# Patient Record
Sex: FEMALE | Race: WHITE | NOT HISPANIC OR LATINO | Employment: OTHER | URBAN - METROPOLITAN AREA
[De-identification: names, ages, dates, MRNs, and addresses within clinical notes are randomized per-mention and may not be internally consistent; named-entity substitution may affect disease eponyms.]

---

## 2017-02-17 ENCOUNTER — APPOINTMENT (OUTPATIENT)
Dept: RADIOLOGY | Facility: MEDICAL CENTER | Age: 82
DRG: 064 | End: 2017-02-17
Attending: EMERGENCY MEDICINE
Payer: MEDICARE

## 2017-02-17 ENCOUNTER — RESOLUTE PROFESSIONAL BILLING HOSPITAL PROF FEE (OUTPATIENT)
Dept: HOSPITALIST | Facility: MEDICAL CENTER | Age: 82
End: 2017-02-17
Payer: MEDICARE

## 2017-02-17 ENCOUNTER — APPOINTMENT (OUTPATIENT)
Dept: RADIOLOGY | Facility: MEDICAL CENTER | Age: 82
DRG: 064 | End: 2017-02-17
Attending: HOSPITALIST
Payer: MEDICARE

## 2017-02-17 ENCOUNTER — HOSPITAL ENCOUNTER (INPATIENT)
Facility: MEDICAL CENTER | Age: 82
LOS: 8 days | DRG: 064 | End: 2017-02-25
Attending: EMERGENCY MEDICINE | Admitting: HOSPITALIST
Payer: MEDICARE

## 2017-02-17 DIAGNOSIS — R53.1 WEAKNESS: ICD-10-CM

## 2017-02-17 DIAGNOSIS — A41.9 SEPSIS, DUE TO UNSPECIFIED ORGANISM: ICD-10-CM

## 2017-02-17 DIAGNOSIS — S06.5XAA SDH (SUBDURAL HEMATOMA) (HCC): ICD-10-CM

## 2017-02-17 DIAGNOSIS — E86.9 VOLUME DEPLETION: ICD-10-CM

## 2017-02-17 PROBLEM — R65.10 SIRS (SYSTEMIC INFLAMMATORY RESPONSE SYNDROME) (HCC): Status: ACTIVE | Noted: 2017-02-17

## 2017-02-17 PROBLEM — I95.9 HYPOTENSION: Status: ACTIVE | Noted: 2017-02-17

## 2017-02-17 LAB
ALBUMIN SERPL BCP-MCNC: 3.5 G/DL (ref 3.2–4.9)
ALBUMIN/GLOB SERPL: 1.3 G/DL
ALP SERPL-CCNC: 83 U/L (ref 30–99)
ALT SERPL-CCNC: 10 U/L (ref 2–50)
ANION GAP SERPL CALC-SCNC: 10 MMOL/L (ref 0–11.9)
APPEARANCE UR: CLEAR
AST SERPL-CCNC: 16 U/L (ref 12–45)
BASOPHILS # BLD AUTO: 0.5 % (ref 0–1.8)
BASOPHILS # BLD: 0.06 K/UL (ref 0–0.12)
BILIRUB SERPL-MCNC: 0.8 MG/DL (ref 0.1–1.5)
BILIRUB UR QL STRIP.AUTO: NEGATIVE
BUN SERPL-MCNC: 12 MG/DL (ref 8–22)
CALCIUM SERPL-MCNC: 8.7 MG/DL (ref 8.4–10.2)
CHLORIDE SERPL-SCNC: 103 MMOL/L (ref 96–112)
CO2 SERPL-SCNC: 22 MMOL/L (ref 20–33)
COLOR UR: YELLOW
CREAT SERPL-MCNC: 0.92 MG/DL (ref 0.5–1.4)
EOSINOPHIL # BLD AUTO: 0.02 K/UL (ref 0–0.51)
EOSINOPHIL NFR BLD: 0.2 % (ref 0–6.9)
ERYTHROCYTE [DISTWIDTH] IN BLOOD BY AUTOMATED COUNT: 44.7 FL (ref 35.9–50)
GFR SERPL CREATININE-BSD FRML MDRD: 57 ML/MIN/1.73 M 2
GLOBULIN SER CALC-MCNC: 2.7 G/DL (ref 1.9–3.5)
GLUCOSE SERPL-MCNC: 110 MG/DL (ref 65–99)
GLUCOSE UR STRIP.AUTO-MCNC: NEGATIVE MG/DL
HCT VFR BLD AUTO: 38.5 % (ref 37–47)
HGB BLD-MCNC: 12.8 G/DL (ref 12–16)
IMM GRANULOCYTES # BLD AUTO: 0.03 K/UL (ref 0–0.11)
IMM GRANULOCYTES NFR BLD AUTO: 0.3 % (ref 0–0.9)
KETONES UR STRIP.AUTO-MCNC: ABNORMAL MG/DL
LACTATE BLD-SCNC: 2.11 MMOL/L (ref 0.5–2)
LACTATE BLD-SCNC: 2.59 MMOL/L (ref 0.5–2)
LACTATE BLD-SCNC: 2.68 MMOL/L (ref 0.5–2)
LEUKOCYTE ESTERASE UR QL STRIP.AUTO: NEGATIVE
LYMPHOCYTES # BLD AUTO: 1.71 K/UL (ref 1–4.8)
LYMPHOCYTES NFR BLD: 15.3 % (ref 22–41)
MCH RBC QN AUTO: 30.4 PG (ref 27–33)
MCHC RBC AUTO-ENTMCNC: 33.2 G/DL (ref 33.6–35)
MCV RBC AUTO: 91.4 FL (ref 81.4–97.8)
MICRO URNS: ABNORMAL
MONOCYTES # BLD AUTO: 1.23 K/UL (ref 0–0.85)
MONOCYTES NFR BLD AUTO: 11 % (ref 0–13.4)
NEUTROPHILS # BLD AUTO: 8.11 K/UL (ref 2–7.15)
NEUTROPHILS NFR BLD: 72.7 % (ref 44–72)
NITRITE UR QL STRIP.AUTO: NEGATIVE
NRBC # BLD AUTO: 0 K/UL
NRBC BLD AUTO-RTO: 0 /100 WBC
PH UR STRIP.AUTO: 6.5 [PH]
PLATELET # BLD AUTO: 276 K/UL (ref 164–446)
PMV BLD AUTO: 9.7 FL (ref 9–12.9)
POTASSIUM SERPL-SCNC: 3.9 MMOL/L (ref 3.6–5.5)
PROT SERPL-MCNC: 6.2 G/DL (ref 6–8.2)
PROT UR QL STRIP: NEGATIVE MG/DL
RBC # BLD AUTO: 4.21 M/UL (ref 4.2–5.4)
RBC UR QL AUTO: NEGATIVE
SODIUM SERPL-SCNC: 135 MMOL/L (ref 135–145)
SP GR UR STRIP.AUTO: 1.01
SPECIMEN DRAWN FROM PATIENT: ABNORMAL
WBC # BLD AUTO: 11.2 K/UL (ref 4.8–10.8)

## 2017-02-17 PROCEDURE — 87086 URINE CULTURE/COLONY COUNT: CPT

## 2017-02-17 PROCEDURE — 83605 ASSAY OF LACTIC ACID: CPT

## 2017-02-17 PROCEDURE — 70450 CT HEAD/BRAIN W/O DYE: CPT

## 2017-02-17 PROCEDURE — 71010 DX-CHEST-PORTABLE (1 VIEW): CPT

## 2017-02-17 PROCEDURE — 99285 EMERGENCY DEPT VISIT HI MDM: CPT

## 2017-02-17 PROCEDURE — 770020 HCHG ROOM/CARE - TELE (206)

## 2017-02-17 PROCEDURE — 700102 HCHG RX REV CODE 250 W/ 637 OVERRIDE(OP): Performed by: HOSPITALIST

## 2017-02-17 PROCEDURE — 96365 THER/PROPH/DIAG IV INF INIT: CPT

## 2017-02-17 PROCEDURE — 700105 HCHG RX REV CODE 258: Performed by: HOSPITALIST

## 2017-02-17 PROCEDURE — 99223 1ST HOSP IP/OBS HIGH 75: CPT | Mod: AI | Performed by: HOSPITALIST

## 2017-02-17 PROCEDURE — 87040 BLOOD CULTURE FOR BACTERIA: CPT

## 2017-02-17 PROCEDURE — A9270 NON-COVERED ITEM OR SERVICE: HCPCS | Performed by: HOSPITALIST

## 2017-02-17 PROCEDURE — 700111 HCHG RX REV CODE 636 W/ 250 OVERRIDE (IP): Performed by: HOSPITALIST

## 2017-02-17 PROCEDURE — 36415 COLL VENOUS BLD VENIPUNCTURE: CPT

## 2017-02-17 PROCEDURE — 80053 COMPREHEN METABOLIC PANEL: CPT

## 2017-02-17 PROCEDURE — 85025 COMPLETE CBC W/AUTO DIFF WBC: CPT

## 2017-02-17 PROCEDURE — 700111 HCHG RX REV CODE 636 W/ 250 OVERRIDE (IP): Performed by: EMERGENCY MEDICINE

## 2017-02-17 PROCEDURE — 81003 URINALYSIS AUTO W/O SCOPE: CPT

## 2017-02-17 PROCEDURE — 96361 HYDRATE IV INFUSION ADD-ON: CPT

## 2017-02-17 RX ORDER — CLOPIDOGREL BISULFATE 75 MG/1
75 TABLET ORAL DAILY
Status: DISCONTINUED | OUTPATIENT
Start: 2017-02-17 | End: 2017-02-17

## 2017-02-17 RX ORDER — UREA 10 %
800 LOTION (ML) TOPICAL EVERY MORNING
Status: ON HOLD | COMMUNITY
End: 2017-02-25

## 2017-02-17 RX ORDER — SODIUM CHLORIDE 9 MG/ML
30 INJECTION, SOLUTION INTRAVENOUS
Status: DISCONTINUED | OUTPATIENT
Start: 2017-02-17 | End: 2017-02-25 | Stop reason: HOSPADM

## 2017-02-17 RX ORDER — SODIUM CHLORIDE 9 MG/ML
500 INJECTION, SOLUTION INTRAVENOUS
Status: DISCONTINUED | OUTPATIENT
Start: 2017-02-17 | End: 2017-02-25 | Stop reason: HOSPADM

## 2017-02-17 RX ORDER — LEVOTHYROXINE SODIUM 0.05 MG/1
100 TABLET ORAL
Status: DISCONTINUED | OUTPATIENT
Start: 2017-02-17 | End: 2017-02-17

## 2017-02-17 RX ORDER — ACETAMINOPHEN 325 MG/1
650 TABLET ORAL EVERY 6 HOURS PRN
Status: ON HOLD | COMMUNITY
End: 2017-02-25

## 2017-02-17 RX ORDER — SODIUM CHLORIDE 9 MG/ML
30 INJECTION, SOLUTION INTRAVENOUS ONCE
Status: COMPLETED | OUTPATIENT
Start: 2017-02-17 | End: 2017-02-17

## 2017-02-17 RX ORDER — DOCUSATE SODIUM 100 MG/1
100 CAPSULE, LIQUID FILLED ORAL 2 TIMES DAILY
Status: DISCONTINUED | OUTPATIENT
Start: 2017-02-17 | End: 2017-02-25 | Stop reason: HOSPADM

## 2017-02-17 RX ORDER — VENLAFAXINE HYDROCHLORIDE 75 MG/1
150 CAPSULE, EXTENDED RELEASE ORAL DAILY
Status: DISCONTINUED | OUTPATIENT
Start: 2017-02-17 | End: 2017-02-25 | Stop reason: HOSPADM

## 2017-02-17 RX ORDER — LATANOPROST 50 UG/ML
1 SOLUTION/ DROPS OPHTHALMIC NIGHTLY
Status: ON HOLD | COMMUNITY
End: 2017-02-25

## 2017-02-17 RX ORDER — CEFTRIAXONE 2 G/1
2 INJECTION, POWDER, FOR SOLUTION INTRAMUSCULAR; INTRAVENOUS ONCE
Status: COMPLETED | OUTPATIENT
Start: 2017-02-17 | End: 2017-02-17

## 2017-02-17 RX ORDER — LEVOTHYROXINE SODIUM 0.05 MG/1
100 TABLET ORAL
Status: DISCONTINUED | OUTPATIENT
Start: 2017-02-17 | End: 2017-02-21

## 2017-02-17 RX ORDER — VENLAFAXINE HYDROCHLORIDE 150 MG/1
150 CAPSULE, EXTENDED RELEASE ORAL DAILY
Status: DISCONTINUED | OUTPATIENT
Start: 2017-02-17 | End: 2017-02-17

## 2017-02-17 RX ORDER — SODIUM CHLORIDE 9 MG/ML
2000 INJECTION, SOLUTION INTRAVENOUS ONCE
Status: COMPLETED | OUTPATIENT
Start: 2017-02-17 | End: 2017-02-17

## 2017-02-17 RX ADMIN — VENLAFAXINE HYDROCHLORIDE 150 MG: 75 CAPSULE, EXTENDED RELEASE ORAL at 18:39

## 2017-02-17 RX ADMIN — SODIUM CHLORIDE 1701 ML: 9 INJECTION, SOLUTION INTRAVENOUS at 13:47

## 2017-02-17 RX ADMIN — DEXTROSE MONOHYDRATE 500 MG: 50 INJECTION, SOLUTION INTRAVENOUS at 18:40

## 2017-02-17 RX ADMIN — SODIUM CHLORIDE 2000 ML: 9 INJECTION, SOLUTION INTRAVENOUS at 18:16

## 2017-02-17 RX ADMIN — CEFTRIAXONE SODIUM 2 G: 2 INJECTION, POWDER, FOR SOLUTION INTRAMUSCULAR; INTRAVENOUS at 15:53

## 2017-02-17 ASSESSMENT — LIFESTYLE VARIABLES
EVER_SMOKED: YES
ALCOHOL_USE: NO

## 2017-02-17 ASSESSMENT — PAIN SCALES - GENERAL: PAINLEVEL_OUTOF10: 0

## 2017-02-17 NOTE — ED NOTES
Sepsis protocal initiated, pt roomed, ED tech in room for lab draw. Report given to Lorraine ESCOBAR

## 2017-02-17 NOTE — ED NOTES
Per caregiver pt increasingly disoriented x 1 week, had recent fall. Today pt unable to walk, hold things due to weakness on left side. Dr. Montoya aware.

## 2017-02-17 NOTE — IP AVS SNAPSHOT
" Home Care Instructions                                                                                                                  Name:Erlinda Chowdhury  Medical Record Number:[id number]  CSN: [id number]    YOB: 1926   Age: 89 y.o.  Sex: female  HT:1.626 m (5' 4\") WT: 60.3 kg (132 lb 15 oz)          Admit Date: 2/17/2017     Discharge Date:   Today's Date: 2/25/2017  Attending Doctor:  Azeem Monroe M.D.                  Allergies:  Codeine; Quinine; and Vicodin            Discharge Instructions       Discharge Instructions    Discharged to other by ambulance with escort. Discharged via ambulance, hospital escort: Yes.  Special equipment needed: Not Applicable    Be sure to schedule a follow-up appointment with your primary care doctor or any specialists as instructed.     Discharge Plan:   Diet Plan: Discussed  Activity Level: Discussed  Confirmed Follow up Appointment: Patient to Call and Schedule Appointment  Confirmed Symptoms Management: Discussed  Medication Reconciliation Updated: Yes  Influenza Vaccine Indication: Not indicated: Previously immunized this influenza season and > 8 years of age    I understand that a diet low in cholesterol, fat, and sodium is recommended for good health. Unless I have been given specific instructions below for another diet, I accept this instruction as my diet prescription.   Other diet: Regular dysphasia III with thin liquids    Special Instructions:   Please resume activity as tolerated.  Please resume a dysphasia III diet.  Please follow up with your primary care physician within two weeks of hospitalization.    · Is patient discharged on Warfarin / Coumadin?   No     · Is patient Post Blood Transfusion?  No    Sepsis, Adult  Sepsis is a serious infection of your blood or tissues that affects your whole body. The infection that causes sepsis may be bacterial, viral, fungal, or parasitic. Sepsis may be life threatening. Sepsis can cause your blood " pressure to drop. This may result in shock. Shock causes your central nervous system and your organs to stop working correctly.   RISK FACTORS  Sepsis can happen in anyone, but it is more likely to happen in people who have weakened immune systems.  SIGNS AND SYMPTOMS   Symptoms of sepsis can include:  · Fever or low body temperature (hypothermia).  · Rapid breathing (hyperventilation).  · Chills.  · Rapid heartbeat (tachycardia).  · Confusion or light-headedness.  · Trouble breathing.  · Urinating much less than usual.  · Cool, clammy skin or red, flushed skin.  · Other problems with the heart, kidneys, or brain.  DIAGNOSIS   Your health care provider will likely do tests to look for an infection, to see if the infection has spread to your blood, and to see how serious your condition is. Tests can include:  · Blood tests, including cultures of your blood.  · Cultures of other fluids from your body, such as:  ¨ Urine.  ¨ Pus from wounds.  ¨ Mucus coughed up from your lungs.  · Urine tests other than cultures.  · X-ray exams or other imaging tests.  TREATMENT   Treatment will begin with elimination of the source of infection. If your sepsis is likely caused by a bacterial or fungal infection, you will be given antibiotic or antifungal medicines.  You may also receive:  · Oxygen.  · Fluids through an IV tube.  · Medicines to increase your blood pressure.  · A machine to clean your blood (dialysis) if your kidneys fail.  · A machine to help you breathe if your lungs fail.  SEEK IMMEDIATE MEDICAL CARE IF:  You get an infection or develop any of the signs and symptoms of sepsis after surgery or a hospitalization.     This information is not intended to replace advice given to you by your health care provider. Make sure you discuss any questions you have with your health care provider.     Document Released: 09/15/2004 Document Revised: 05/03/2016 Document Reviewed: 08/25/2014  [employer] Interactive Patient Education ©2016  [employer]    Depression / Suicide Risk    As you are discharged from this [facility] facility, it is important to learn how to keep safe from harming yourself.    Recognize the warning signs:  · Abrupt changes in personality, positive or negative- including increase in energy   · Giving away possessions  · Change in eating patterns- significant weight changes-  positive or negative  · Change in sleeping patterns- unable to sleep or sleeping all the time   · Unwillingness or inability to communicate  · Depression  · Unusual sadness, discouragement and loneliness  · Talk of wanting to die  · Neglect of personal appearance   · Rebelliousness- reckless behavior  · Withdrawal from people/activities they love  · Confusion- inability to concentrate     If you or a loved one observes any of these behaviors or has concerns about self-harm, here's what you can do:  · Talk about it- your feelings and reasons for harming yourself  · Remove any means that you might use to hurt yourself (examples: pills, rope, extension cords, firearm)  · Get professional help from the community (Mental Health, Substance Abuse, psychological counseling)  · Do not be alone:Call your Safe Contact- someone whom you trust who will be there for you.  · Call your local CRISIS HOTLINE 406-6668 or 138-995-6492  · Call your local Children's Mobile Crisis Response Team [place] (309) 160-5192 or www.Mashalot  · Call the toll free National Suicide Prevention Hotlines   · National Suicide Prevention Lifeline 671-245-VLOC (1554)  · National Hope Line Network 800-SUICIDE (745-2797)        Follow-up Information     1. Follow up with Tr Santiago M.D. In 2 weeks.    Specialty:  Internal Medicine    Contact information    [id number]  [place] [address] [phone]           Discharge Medication Instructions:    Below are the medications your physician expects you to take upon discharge:    Review all your home  medications and newly ordered medications with your doctor and/or pharmacist. Follow medication instructions as directed by your doctor and/or pharmacist.    Please keep your medication list with you and share with your physician.               Medication List      START taking these medications        Instructions     MG Caps   Last time this was given:  100 mg on 2/24/2017  9:04 PM    Take 100 mg by mouth 2 Times a Day.   Dose:  100 mg       metoprolol 25 MG Tabs   Last time this was given:  25 mg on 2/24/2017  9:04 PM   Commonly known as:  LOPRESSOR    Take 1 Tab by mouth 2 Times a Day.   Dose:  25 mg         CHANGE how you take these medications        Instructions    acetaminophen 500 MG Tabs   What changed:    - medication strength  - how much to take  - when to take this  - reasons to take this   Last time this was given:  1,000 mg on 2/24/2017  9:04 PM   Commonly known as:  TYLENOL    Take 2 Tabs by mouth 2 Times a Day.   Dose:  1000 mg         CONTINUE taking these medications        Instructions    levothyroxine 100 MCG Tabs   Last time this was given:  75 mcg on 2/25/2017  6:11 AM   Commonly known as:  SYNTHROID    Take 1 Tab by mouth Every morning on an empty stomach.   Dose:  100 mcg       venlafaxine 150 MG extended-release capsule   Last time this was given:  150 mg on 2/22/2017  8:31 AM   Commonly known as:  EFFEXOR-XR    Doctor's comments:  Dose change   Take 1 Cap by mouth every day.   Dose:  150 mg         STOP taking these medications     aspirin EC 81 MG Tbec   Commonly known as:  ECOTRIN       clopidogrel 75 MG Tabs   Commonly known as:  PLAVIX       Cranberry 450 MG Tabs       folic acid 800 MCG tablet   Commonly known as:  FOLVITE       latanoprost 0.005 % Soln   Commonly known as:  XALATAN       MULTILEX-T&M Tabs       silver sulfADIAZINE 1 % Crea   Commonly known as:  SILVADENE       vitamin D (Ergocalciferol) 17437 UNITS Caps capsule   Commonly known as:  DRISDOL                  Instructions           Diet / Nutrition:    Follow any diet instructions given to you by your doctor or the dietician, including how much salt (sodium) you are allowed each day.    If you are overweight, talk to your doctor about a weight reduction plan.    Activity:    Remain physically active following your doctor's instructions about exercise and activity.    Rest often.     Any time you become even a little tired or short of breath, SIT DOWN and rest.    Worsening Symptoms:    Report any of the following signs and symptoms to the doctor's office immediately:    *Pain of jaw, arm, or neck  *Chest pain not relieved by medication                               *Dizziness or loss of consciousness  *Difficulty breathing even when at rest   *More tired than usual                                       *Bleeding drainage or swelling of surgical site  *Swelling of feet, ankles, legs or stomach                 *Fever (>100ºF)  *Pink or blood tinged sputum  *Weight gain (3lbs/day or 5lbs /week)           *Shock from internal defibrillator (if applicable)  *Palpitations or irregular heartbeats                *Cool and/or numb extremities    Stroke Awareness    Common Risk Factors for Stroke include:    Age  Atrial Fibrillation  Carotid Artery Stenosis  Diabetes Mellitus  Excessive alcohol consumption  High blood pressure  Overweight   Physical inactivity  Smoking    Warning signs and symptoms of a stroke include:    *Sudden numbness or weakness of the face, arm or leg (especially on one side of the body).  *Sudden confusion, trouble speaking or understanding.  *Sudden trouble seeing in one or both eyes.  *Sudden trouble walking, dizziness, loss of balance or coordination.Sudden severe headache with no known cause.    It is very important to get treatment quickly when a stroke occurs. If you experience any of the above warning signs, call 911 immediately.                   Disclaimer         Quit Smoking / Tobacco Use:    I  understand the use of any tobacco products increases my chance of suffering from future heart disease or stroke and could cause other illnesses which may shorten my life. Quitting the use of tobacco products is the single most important thing I can do to improve my health. For further information on smoking / tobacco cessation call a Toll Free Quit Line at 1-463.395.8308 (*National Cancer [place]) or 1-584.716.3688 (American Lung Association) or you can access the web based program at www.lungusa.org.    [place] Tobacco Users Help Line:  (294) 138-9711       Toll Free: 1-984.799.7760  Quit Tobacco Program [facility] (664)678-5476    Crisis Hotline:    [place] Crisis Hotline:  3-625-YUOIHLK or 1-890.605.4096    [place] Crisis Hotline:    1-511.800.1345 or 211-494-2486    Discharge Survey:   Thank you for choosing [facility]. We hope we did everything we could to make your hospital stay a pleasant one. You may be receiving a phone survey and we would appreciate your time and participation in answering the questions. Your input is very valuable to us in our efforts to improve our service to our patients and their families.        My signature on this form indicates that:    1. I have reviewed and understand the above information.  2. My questions regarding this information have been answered to my satisfaction.  3. I have formulated a plan with my discharge nurse to obtain my prescribed medications for home.                  Disclaimer         __________________________________                     __________       ________                       Patient Signature                                                 Date                    Time

## 2017-02-17 NOTE — IP AVS SNAPSHOT
" <p align=\"LEFT\"><IMG SRC=\"//[email or web address]$/[email or web address]\" alt=\"Image\" WIDTH=\"50%\" HEIGHT=\"200\" BORDER=\"\"></p>                   Name:Erlinda Chowdhury  Medical Record Number:[id number]  CSN: [id number]    YOB: 1926   Age: 89 y.o.  Sex: female  HT:1.626 m (5' 4\") WT: 60.3 kg (132 lb 15 oz)          Admit Date: 2/17/2017     Discharge Date:   Today's Date: 2/25/2017  Attending Doctor:  Azeem Monroe M.D.                  Allergies:  Codeine; Quinine; and Vicodin          Follow-up Information     1. Follow up with Tr Santiago M.D. In 2 weeks.    Specialty:  Internal Medicine    Contact information    [address] [facility] [id number]  [facility] [address] [phone]           Medication List      Take these Medications        Instructions    acetaminophen 500 MG Tabs   What changed:    - medication strength  - how much to take  - when to take this  - reasons to take this   Commonly known as:  TYLENOL    Take 2 Tabs by mouth 2 Times a Day.   Dose:  1000 mg        MG Caps    Take 100 mg by mouth 2 Times a Day.   Dose:  100 mg       levothyroxine 100 MCG Tabs   Commonly known as:  SYNTHROID    Take 1 Tab by mouth Every morning on an empty stomach.   Dose:  100 mcg       metoprolol 25 MG Tabs   Commonly known as:  LOPRESSOR    Take 1 Tab by mouth 2 Times a Day.   Dose:  25 mg       venlafaxine 150 MG extended-release capsule   Commonly known as:  EFFEXOR-XR    Doctor's comments:  Dose change   Take 1 Cap by mouth every day.   Dose:  150 mg         "

## 2017-02-17 NOTE — IP AVS SNAPSHOT
2/25/2017          Erlinda Chowdhury  [address] [place] [address]    Dear Erlinda:    [facility] wants to ensure your discharge home is safe and you or your loved ones have had all your questions answered regarding your care after you leave the hospital.    You may receive a telephone call within two days of your discharge.  This call is to make certain you understand your discharge instructions as well as ensure we provided you with the best care possible during your stay with us.     The call will only last approximately 3-5 minutes and will be done by a nurse.    Once again, we want to ensure your discharge home is safe and that you have a clear understanding of any next steps in your care.  If you have any questions or concerns, please do not hesitate to contact us, we are here for you.  Thank you for choosing [facility] for your healthcare needs.    Sincerely,    Surjit William    [facility]

## 2017-02-17 NOTE — ED NOTES
Mini cath procedure explained and performed. Privacy maintained for pt. Pt tolerated procedure well. Urine to lab. Warm blanket provided. Will continue to monitor.

## 2017-02-17 NOTE — ED NOTES
Med rec complete per pt's MAR from [facility]  MAR copied and returned to pt's chart  Allergies reviewed  No ABX noted on MAR (for February)  Pt has a PRN order for Xanax 0.5mg BID PRN but has not had in >2 weeks,  Removed from pt's med rec for this reason

## 2017-02-17 NOTE — ED NOTES
"Patient brought in by [facility] from [facility]. Family complains that patient appears to be \"lethargic and may have a UTI\" per IOANA. Per report, patient is A&O, hx of stroke, recent fall. Patient currently denies any pain or discomfort. Family will be in.   "

## 2017-02-17 NOTE — ED NOTES
ERP at bedside. Pt agrees with plan of care discussed by ERP. AIDET acknowledged with patient. IV established. Blood sent to lab. Medicinal interventions carried out per ERP orders. Hamrnegar in low position, side rail up for pt safety. Call light within reach. Will continue to monitor.

## 2017-02-17 NOTE — IP AVS SNAPSHOT
Classiqs Access Code: Activation code not generated  Current Classiqs Status: Active    [email or web address]  A secure, online tool to manage your health information     [email or web address]’s Classiqs® is a secure, online tool that connects you to your personalized health information from the privacy of your home -- day or night - making it very easy for you to manage your healthcare. Once the activation process is completed, you can even access your medical information using the Classiqs mino, which is available for free in the Apple Mino store or Google Play store.     Classiqs provides the following levels of access (as shown below):   My Chart Features   [facility] Primary Care Doctor [facility]  Specialists [facility]  Urgent  Care Non-[facility]  Primary Care  Doctor   Email your healthcare team securely and privately 24/7 X X X X   Manage appointments: schedule your next appointment; view details of past/upcoming appointments X      Request prescription refills. X      View recent personal medical records, including lab and immunizations X X X X   View health record, including health history, allergies, medications X X X X   Read reports about your outpatient visits, procedures, consult and ER notes X X X X   See your discharge summary, which is a recap of your hospital and/or ER visit that includes your diagnosis, lab results, and care plan. X X       How to register for Classiqs:  1. Go to  [email or web address].  2. Click on the Sign Up Now box, which takes you to the New Member Sign Up page. You will need to provide the following information:  a. Enter your Classiqs Access Code exactly as it appears at the top of this page. (You will not need to use this code after you’ve completed the sign-up process. If you do not sign up before the expiration date, you must request a new code.)   b. Enter your date of birth.   c. Enter your home email address.   d. Click Submit, and follow the next screen’s instructions.  3. Create a Classiqs ID. This will  be your [employer] login ID and cannot be changed, so think of one that is secure and easy to remember.  4. Create a [employer] password. You can change your password at any time.  5. Enter your Password Reset Question and Answer. This can be used at a later time if you forget your password.   6. Enter your e-mail address. This allows you to receive e-mail notifications when new information is available in [employer]  7. Click Sign Up. You can now view your health information.    For assistance activating your [employer] account, call [phone]

## 2017-02-18 ENCOUNTER — APPOINTMENT (OUTPATIENT)
Dept: RADIOLOGY | Facility: MEDICAL CENTER | Age: 82
DRG: 064 | End: 2017-02-18
Attending: HOSPITALIST
Payer: MEDICARE

## 2017-02-18 PROBLEM — S06.5XAA SDH (SUBDURAL HEMATOMA) (HCC): Status: ACTIVE | Noted: 2017-02-18

## 2017-02-18 LAB
ANION GAP SERPL CALC-SCNC: 6 MMOL/L (ref 0–11.9)
BUN SERPL-MCNC: 7 MG/DL (ref 8–22)
CALCIUM SERPL-MCNC: 8.2 MG/DL (ref 8.4–10.2)
CHLORIDE SERPL-SCNC: 106 MMOL/L (ref 96–112)
CO2 SERPL-SCNC: 23 MMOL/L (ref 20–33)
CREAT SERPL-MCNC: 0.84 MG/DL (ref 0.5–1.4)
ERYTHROCYTE [DISTWIDTH] IN BLOOD BY AUTOMATED COUNT: 43.2 FL (ref 35.9–50)
GFR SERPL CREATININE-BSD FRML MDRD: >60 ML/MIN/1.73 M 2
GLUCOSE SERPL-MCNC: 90 MG/DL (ref 65–99)
HCT VFR BLD AUTO: 33.4 % (ref 37–47)
HGB BLD-MCNC: 11.4 G/DL (ref 12–16)
LACTATE BLD-SCNC: 1.29 MMOL/L (ref 0.5–2)
LACTATE BLD-SCNC: 1.29 MMOL/L (ref 0.5–2)
LACTATE BLD-SCNC: 1.33 MMOL/L (ref 0.5–2)
MCH RBC QN AUTO: 30.3 PG (ref 27–33)
MCHC RBC AUTO-ENTMCNC: 34.1 G/DL (ref 33.6–35)
MCV RBC AUTO: 88.8 FL (ref 81.4–97.8)
PLATELET # BLD AUTO: 242 K/UL (ref 164–446)
PMV BLD AUTO: 10.4 FL (ref 9–12.9)
POTASSIUM SERPL-SCNC: 3.7 MMOL/L (ref 3.6–5.5)
RBC # BLD AUTO: 3.76 M/UL (ref 4.2–5.4)
SODIUM SERPL-SCNC: 135 MMOL/L (ref 135–145)
SPECIMEN DRAWN FROM PATIENT: NORMAL
WBC # BLD AUTO: 8.9 K/UL (ref 4.8–10.8)

## 2017-02-18 PROCEDURE — 85027 COMPLETE CBC AUTOMATED: CPT

## 2017-02-18 PROCEDURE — 99233 SBSQ HOSP IP/OBS HIGH 50: CPT | Performed by: HOSPITALIST

## 2017-02-18 PROCEDURE — 97535 SELF CARE MNGMENT TRAINING: CPT

## 2017-02-18 PROCEDURE — 70450 CT HEAD/BRAIN W/O DYE: CPT

## 2017-02-18 PROCEDURE — 94760 N-INVAS EAR/PLS OXIMETRY 1: CPT

## 2017-02-18 PROCEDURE — 700105 HCHG RX REV CODE 258: Performed by: HOSPITALIST

## 2017-02-18 PROCEDURE — 700102 HCHG RX REV CODE 250 W/ 637 OVERRIDE(OP): Performed by: HOSPITALIST

## 2017-02-18 PROCEDURE — A9270 NON-COVERED ITEM OR SERVICE: HCPCS | Performed by: HOSPITALIST

## 2017-02-18 PROCEDURE — 700111 HCHG RX REV CODE 636 W/ 250 OVERRIDE (IP): Performed by: HOSPITALIST

## 2017-02-18 PROCEDURE — 80048 BASIC METABOLIC PNL TOTAL CA: CPT

## 2017-02-18 PROCEDURE — 770020 HCHG ROOM/CARE - TELE (206)

## 2017-02-18 PROCEDURE — 83605 ASSAY OF LACTIC ACID: CPT

## 2017-02-18 PROCEDURE — 700112 HCHG RX REV CODE 229: Performed by: HOSPITALIST

## 2017-02-18 PROCEDURE — 71010 DX-CHEST-PORTABLE (1 VIEW): CPT

## 2017-02-18 RX ORDER — ATORVASTATIN CALCIUM 40 MG/1
40 TABLET, FILM COATED ORAL
Status: DISCONTINUED | OUTPATIENT
Start: 2017-02-18 | End: 2017-02-25 | Stop reason: HOSPADM

## 2017-02-18 RX ADMIN — DOCUSATE SODIUM 100 MG: 100 CAPSULE, LIQUID FILLED ORAL at 21:06

## 2017-02-18 RX ADMIN — ATORVASTATIN CALCIUM 40 MG: 40 TABLET, FILM COATED ORAL at 21:06

## 2017-02-18 RX ADMIN — CEFTRIAXONE 2 G: 2 INJECTION, POWDER, FOR SOLUTION INTRAMUSCULAR; INTRAVENOUS at 08:17

## 2017-02-18 RX ADMIN — VENLAFAXINE HYDROCHLORIDE 150 MG: 75 CAPSULE, EXTENDED RELEASE ORAL at 08:17

## 2017-02-18 RX ADMIN — ASPIRIN 81 MG: 81 TABLET, COATED ORAL at 08:17

## 2017-02-18 RX ADMIN — DOCUSATE SODIUM 100 MG: 100 CAPSULE, LIQUID FILLED ORAL at 08:17

## 2017-02-18 RX ADMIN — DEXTROSE MONOHYDRATE 500 MG: 50 INJECTION, SOLUTION INTRAVENOUS at 09:55

## 2017-02-18 RX ADMIN — LEVOTHYROXINE SODIUM 100 MCG: 50 TABLET ORAL at 06:32

## 2017-02-18 ASSESSMENT — PAIN SCALES - GENERAL
PAINLEVEL_OUTOF10: 0
PAINLEVEL_OUTOF10: 0

## 2017-02-18 ASSESSMENT — LIFESTYLE VARIABLES: EVER_SMOKED: NEVER

## 2017-02-18 ASSESSMENT — ACTIVITIES OF DAILY LIVING (ADL): TOILETING: INDEPENDENT

## 2017-02-18 NOTE — PROGRESS NOTES
"Pt is aware she is in the hospital but thinks she's at [place]-- is very forgetful and will say things such as \"put the blanket in the dryer to warm it up\" and is frequently speaking in Gabonese-- pt able to reorient easily. Other than that, neuro check is wdl. Pt denies any pain or discomforts tonight. Incontinent of large amount of urine-- brief removed and pt assisted up to commode then back to bed with x2 person assistance. Denies other needs. Call light in reach and BA is set.   "

## 2017-02-18 NOTE — CARE PLAN
Problem: Safety  Goal: Will remain free from falls  Outcome: PROGRESSING AS EXPECTED  Pt educated regarding the use of call light when needing assistance. Pt demonstrated and verbalized the proper use of a call light and to call for assistance. Fall precautions in place, treaded slippers on, personal belongings/phone in reach. Pt is very weak and unsteady when getting OOB to commode, and requires X2 person assist. Bed alarm is set.     Problem: Venous Thromboembolism (VTW)/Deep Vein Thrombosis (DVT) Prevention:  Goal: Patient will participate in Venous Thrombosis (VTE)/Deep Vein Thrombosis (DVT)Prevention Measures  Outcome: PROGRESSING AS EXPECTED    02/17/17 2219   OTHER   Mechanical Prophylaxis SCDs, Sequentials (Intermittent Pneumatic Compression Devices)         Problem: Urinary Elimination:  Goal: Ability to reestablish a normal urinary elimination pattern will improve  Outcome: PROGRESSING AS EXPECTED    02/17/17 2219   OTHER   Urinary Elimination Incontinence     Brief from home removed and pt encouraged to get OOB to use the commode.

## 2017-02-18 NOTE — PROGRESS NOTES
Hospital Medicine Progress Note, Adult, Complex               Author: Domonique Montoya Date & Time created: 2/18/2017  11:37 AM     CC: 89F presented with weakness, confusion and difficulty with ambulation and initially admitted for suspected occult infection with SIRs and dehydration but subsequently found to have subacute subdural hematoma    Interval History:  Clinically unchanged, still forgetful, serial CT shows stable SDH    Review of Systems:  Review of Systems   Unable to perform ROS: dementia       Physical Exam:  Physical Exam   Constitutional: No distress.   Frail, elderly   HENT:   Head: Normocephalic and atraumatic.   Right Ear: External ear normal.   Left Ear: External ear normal.   Eyes: EOM are normal. Right eye exhibits no discharge. Left eye exhibits no discharge.   Neck: Neck supple. No JVD present.   Cardiovascular: Normal rate, regular rhythm and normal heart sounds.    Pulmonary/Chest: Effort normal and breath sounds normal. No respiratory distress. She exhibits no tenderness.   Abdominal: Soft. Bowel sounds are normal. She exhibits no distension. There is no tenderness.   Musculoskeletal: Normal range of motion. She exhibits no edema.   Neurological: She is alert. No cranial nerve deficit.   Skin: Skin is warm and dry. She is not diaphoretic. No erythema.   Psychiatric: Cognition and memory are impaired. She exhibits abnormal recent memory.   Nursing note and vitals reviewed.      Labs:  Recent Labs      02/18/17   0025  02/18/17   0418  02/18/17   0757   ISTATSPEC  Venous  Venous  Venous         Recent Labs      02/17/17   1355  02/18/17   0418   SODIUM  135  135   POTASSIUM  3.9  3.7   CHLORIDE  103  106   CO2  22  23   BUN  12  7*   CREATININE  0.92  0.84   CALCIUM  8.7  8.2*     Recent Labs      02/17/17   1355  02/18/17   0418   ALTSGPT  10   --    ASTSGOT  16   --    ALKPHOSPHAT  83   --    TBILIRUBIN  0.8   --    GLUCOSE  110*  90     Recent Labs      02/17/17   1355  02/18/17   0418   RBC   4.21  3.76*   HEMOGLOBIN  12.8  11.4*   HEMATOCRIT  38.5  33.4*   PLATELETCT  276  242     Recent Labs      17   1355  17   0418   WBC  11.2*  8.9   NEUTSPOLYS  72.70*   --    LYMPHOCYTES  15.30*   --    MONOCYTES  11.00   --    EOSINOPHILS  0.20   --    BASOPHILS  0.50   --    ASTSGOT  16   --    ALTSGPT  10   --    ALKPHOSPHAT  83   --    TBILIRUBIN  0.8   --            Hemodynamics:  Temp (24hrs), Av.8 °C (98.2 °F), Min:36.4 °C (97.6 °F), Max:37.2 °C (99 °F)  Temperature: 36.4 °C (97.6 °F)  Pulse  Av.8  Min: 54  Max: 96Heart Rate (Monitored): 66  Blood Pressure : 128/50 mmHg, NIBP: 134/74 mmHg     Respiratory:    Respiration: 18, Pulse Oximetry: 93 %        RLL Breath Sounds: Diminished, LLL Breath Sounds: Diminished  Fluids:  No intake or output data in the 24 hours ending 17 1137  Weight: 60.3 kg (132 lb 15 oz)  GI/Nutrition:  Orders Placed This Encounter   Procedures   • Diet Order     Standing Status: Standing      Number of Occurrences: 1      Standing Expiration Date:      Order Specific Question:  Diet:     Answer:  Regular [1]     Medical Decision Making, by Problem:  Active Hospital Problems    Diagnosis   • SDH (subdural hematoma) (CMS-[facility]) [I62.00]  - Serial CT scan shows stable SDH  - Long discussion with daughter and patient had recorded falls the day prior to admission, in mid January and last September, suspect SDH from fall in January  - Stopped all anticoagulation  - No further serial CTs unless new symptoms  - Continue Q4H neuro exams  - Continue HOB elevation  - Patient previous discussed with Dr. Alonso of neurosurgery and patient can follow up outpt  - PT/OT and SLP for cognitive eval     • SIRS (systemic inflammatory response syndrome) (CMS-[facility]) [R65.10] with Hypotension [I95.9]  - Resolved  - Lactic acidosis and leukocytosis resolved  - No infection declared itself after correction of dehydration  - CXR x 2 negative  - UA negative and culture no growth  - Stop  abx and monitor off     • CVA  - Diagnosed last year and completed rehab, had CTA neck which showed right ICA stenosis and outpt vascular surgery was recommended  - Was previously on Plavix and lipitor, at admission only plavix and ASA were on the med rec  - Held Plavix and ASA in light of bleed  - There is no evidenced base guidelines I could find in the literature to determine when to resume antiplatelet therapy  - WIll start statin today  - CT head shows no active bleed, likely will restart ASA and Plavix in the next few days     • Dementia  - Multifactorial  - Family reports worsening since 2014  - History of EtOH abuse plus CVA last April now confounded by SDH  - Continue monitoring and reorientation     • Hypothyroid [E03.9]  - Synthroid     • Dyslipidemia [E78.5]  - Started statin, unsure why DCd since last April       Medications reviewed and Labs reviewed

## 2017-02-18 NOTE — H&P
CHIEF COMPLAINT:  Brought in with complaints of confusion and weakness.    PRIMARY MEDICAL PHYSICIAN:  Dr. Tr Santiago.    HISTORY OF PRESENT ILLNESS:  This is a 89-year-old female with a history of   hypothyroidism, recent CVA by report, cataracts, macular degeneration and   spinal stenosis with chronic back pain who was a resident at a Memory Care   Nursing home and is brought in after family members noted that she has been   disoriented, feeling weak and has had difficulty with ambulation.  The patient   has been more and more confused over the last 5 days.  This morning, she   awoke with left-sided weakness.  She also began to have difficulty with   ambulation.  She herself denies any complaints; however, a family member who   is at bedside states that the patient has had some fevers and chills, nausea,   but no vomiting.  The patient very recently fell out of her bed details unclear and   patient poor historian but patient apparently found on the ground in the last few days.    She has had increased malaise with cough and rhinorrhea.  She denies any shortness of   breath.  She denies any diarrhea.  She denies any dysuria.    REVIEW OF SYSTEMS:  A full review of systems was completed; however, the   patient is rather a poor historian and limited responses were obtained.    Therefore, all available responses that were pertinent are included in the HPI   above.    PAST MEDICAL HISTORY:  1.  Hypothyroidism.  2.  Cataracts.  3.  Macular degeneration.  4.  Spinal stenosis.  5.  CVA by report.    PAST SURGICAL HISTORY:  1.  Hysterectomy.  2.  Lumbar laminectomy.  3.  Cataract surgery.    SOCIAL HISTORY:  No tobacco or illicit drugs.  Moderate alcohol intake.    Patient is a resident at a memory care facility.    FAMILY HISTORY:  Father with heart disease.  Sister with breast cancer.    ALLERGIES:  CODEINE, QUININE, AND VICODIN.    HOME MEDICATIONS:  1.  Aspirin 81 mg p.o. daily.  2.  Cranberry.  3.  Folic acid.  4.   Xalatan eyedrops.  5.  Silvadene 1 gram apply to affected area.  6.  Tylenol over-the-counter.  7.  Effexor 150 mg p.o. daily.  8.  Plavix 75 mg p.o. daily.  9.  Synthroid 100 mcg p.o. daily.  10.  Vitamin D.  11.  Multivitamin.    PHYSICAL EXAMINATION:  VITAL SIGNS:  Temperature 99.0, heart rate 93, respirations 16, blood pressure   88/49.  Patient is saturating at 96% on room air.  GENERAL:  This is a frail and elderly white female who is in no acute   distress.  She is mildly confused.  HEENT:  Normocephalic, atraumatic.  EOMI, moist mucous membranes.  NECK:  Supple, there is no JVD.  There is no supraclavicular adenopathy.  CARDIOVASCULAR:  Positive S1, S2, regular rate and rhythm.  No murmurs, rubs,   or gallops appreciated.  PULMONARY:  Clear to auscultation bilaterally.  No wheezes, rubs, or rhonchi   heard, diminished at the bases.  GASTROINTESTINAL:  Abdomen soft, nontender, nondistended.  Positive bowel   sounds.  No hepatosplenomegaly.  EXTREMITIES:  Warm, well-perfused:  No clubbing, cyanosis, or edema.    Capillary refill less than 3 seconds.  Distal pulses are intact.  NEUROLOGIC:  A and O x2, person and place, patient answers some questions   appropriately, but then she is sleepy.  She is moving all 4   extremities spontaneously without evidence of focal deficits.    LABORATORY DATA:  WBC 11.2, hemoglobin 12.8, hematocrit 38.5, platelet 276.    Sodium 135, potassium 3.9, chloride 103, CO2 22, glucose 110, BUN 12,   creatinine 0.9, GFR 57.  Lactic acid 2.11.  UA is negative for nitrites or   leukocyte esterase.    IMAGING:  Chest x-ray:  No evidence of acute cardiopulmonary process.    ASSESSMENT AND PLAN:  This is a 89-year-old female who was brought in by   family members from a memory care unit with complaints of worsening confusion,   left-sided weakness, difficulty with ambulation and fatigue.  Upon assessment   in the ER, she is noted to have evidence of systemic inflammatory response    syndrome.  1.  Systemic inflammatory response syndrome versus early sepsis:  There is no   clear etiology at this point.  The patient does seem somewhat clinically   dehydrated; therefore, I will be giving her fluid resuscitation for the sepsis   protocol.  Her UA and chest x-ray are currently negative.  However, she does   have leukocytosis, lactic acidosis and she was hypotensive at the time of   presentation.  She will be monitored closely on the telemetry floor on a   sepsis protocol.  I will continue goal directed therapy with IV fluid   resuscitation and empiric antibiotics with ceftriaxone and azithromycin.  I   will repeat a chest x-ray tomorrow morning after she has had a night of fluid   resuscitation to see if anything has to be cleared itself.  We will attempt to   collect a sputum culture and also monitor her blood culture.  I will trend   her lactic acid level.  2.  Left weakness and difficulty with ambulation:  The patient does not have   any clear focal deficits on bedside exam, however, she does carry a history of   cerebrovascular accident by report.  I will check a CT of the head to begin   with and if there are any abnormalities or any worsening of symptoms, then we   will consider further workup.  3.  History of hypothyroidism:  Continue home Synthroid.  4.  History of cerebrovascular accident:  Continue home Plavix and aspirin.    DISPOSITION:  Telemetry.    PROPHYLAXIS:  Sequential compression devices for DVT prophylaxis, no PPI   indicated at this time, stool softeners ordered.    CODE:  Full.       ____________________________________     TOMÁS POOLE MD    DTC / NTS    DD:  02/17/2017 16:17:46  DT:  02/17/2017 19:11:18    D#:  [id number]  Job#:  [id number]

## 2017-02-18 NOTE — ED PROVIDER NOTES
ED Provider Note    CHIEF COMPLAINT  Chief Complaint   Patient presents with   • Weakness     lethargy       [facility]  Erlinda Chowdhury is a 89 y.o. female who presents to the emergency department with generalized weakness. Family members feel that the patient is not as vigorous as she usually is. She has generalized weakness. A be some increased confusion. They're concerned that she may have a urinary tract infection. The patient does not have any complaints. She denies any chest pain or abdominal pain. She says that she does not feel confused. She denied any abdominal pain. She denies any recent trauma.    REVIEW OF SYSTEMS  See HPI for further details. All other systems are negative.     PAST MEDICAL HISTORY  Past Medical History   Diagnosis Date   • Hypothyroid 5/20/2009   • S/P HIRAL-BSO 5/20/2009   • Chronic low back pain 5/20/2009   • Macular degeneration 5/20/2009   • CATARACT 5/20/2009   • Dyslipidemia 5/20/2009   • Preventative health care 5/20/2009   • Spinal stenosis        FAMILY HISTORY  Family History   Problem Relation Age of Onset   • Heart Disease Father    • Cancer Sister      breast cancer       SOCIAL HISTORY  Social History     Social History   • Marital Status:      Spouse Name: N/A   • Number of Children: N/A   • Years of Education: N/A     Social History Main Topics   • Smoking status: Never Smoker    • Smokeless tobacco: Never Used   • Alcohol Use: 4.2 oz/week     7 Standard drinks or equivalent per week      Comment: 7  per week/ 1 a day   • Drug Use: No   • Sexual Activity: Not on file     Other Topics Concern   • Not on file     Social History Narrative       SURGICAL HISTORY  Past Surgical History   Procedure Laterality Date   • Lumbar laminectomy diskectomy  5/25/2009     Performed by RENAY WANG at SURGERY [facility]   • Hysterectomy laparoscopy     • Cataract phaco with iol  9/29/2014     Performed by Edi Lombardo M.D. at SURGERY [facility] ORS       CURRENT  "MEDICATIONS  Home Medications     Reviewed by Sanchez Alan (Pharmacy Tech) on 02/17/17 at 1336  Med List Status: Complete    Medication Last Dose Status    acetaminophen (TYLENOL) 325 MG Tab 2/17/2017 Active    aspirin EC (ECOTRIN) 81 MG Tablet Delayed Response 2/17/2017 Active    clopidogrel (PLAVIX) 75 MG Tab 2/17/2017 Active    Cranberry 450 MG Tab 2/17/2017 Active    folic acid (FOLVITE) 800 MCG tablet 2/17/2017 Active    latanoprost (XALATAN) 0.005 % Solution 2/16/2017 Active    levothyroxine (SYNTHROID) 100 MCG Tab 2/17/2017 Active    Multiple Vitamins-Minerals (MULTILEX-T&M) Tab 2/17/2017 Active    silver sulfADIAZINE (SILVADENE) 1 % Cream 2/17/2017 Active    venlafaxine (EFFEXOR-XR) 150 MG extended-release capsule 2/17/2017 Active    vitamin D, Ergocalciferol, (DRISDOL) 72044 UNITS Cap capsule 2/11/2017 Active                ALLERGIES  Allergies   Allergen Reactions   • Codeine Vomiting     \"I get loopy\"   • Quinine Vomiting   • Vicodin [Hydrocodone-Acetaminophen]        PHYSICAL EXAM  VITAL SIGNS: BP 88/49 mmHg  Pulse 67  Temp(Src) 37.2 °C (99 °F)  Resp 21  Wt 56.7 kg (125 lb)  SpO2 94%  Constitutional: Well developed, Well nourished, no acute distress, Non-toxic appearance. Elderly female.  HENT: Normocephalic, Atraumatic, Bilateral external ears normal, Oropharynx dry, No oral exudates, Nose normal.   Eyes: PERRL, EOMI, Conjunctiva normal, No discharge.   Neck: Normal range of motion, No tenderness, Supple, No stridor.   Lymphatic: No lymphadenopathy noted.   Cardiovascular: Normal heart rate, Normal rhythm, No murmurs, No rubs, No gallops.   Thorax & Lungs: Normal breath sounds, No respiratory distress, No wheezing, No chest tenderness.   Abdomen: Soft, nontender, nondistended, no organomegaly, no palpable masses.   Skin: Warm, Dry, No erythema, No rash.   Back: No tenderness, No CVA tenderness.   Extremities: Intact distal pulses, No edema, No tenderness, No cyanosis, No clubbing. "   Neurologic: Alert & oriented x 3, Normal motor function, Normal sensory function, No focal deficits noted.       RADIOLOGY/PROCEDURES  DX-CHEST-PORTABLE (1 VIEW)   Final Result      No evidence of acute cardiopulmonary process.      CT-HEAD W/O    (Results Pending)     Results for orders placed or performed during the hospital encounter of 02/17/17   Lactic acid (lactate)   Result Value Ref Range    Lactic Acid 2.11 (H) 0.50 - 2.00 mmol/L    Specimen Venous    Lactic acid (lactate)   Result Value Ref Range    Lactic Acid 2.59 (H) 0.50 - 2.00 mmol/L    Specimen Venous    CBC WITH DIFFERENTIAL   Result Value Ref Range    WBC 11.2 (H) 4.8 - 10.8 K/uL    RBC 4.21 4.20 - 5.40 M/uL    Hemoglobin 12.8 12.0 - 16.0 g/dL    Hematocrit 38.5 37.0 - 47.0 %    MCV 91.4 81.4 - 97.8 fL    MCH 30.4 27.0 - 33.0 pg    MCHC 33.2 (L) 33.6 - 35.0 g/dL    RDW 44.7 35.9 - 50.0 fL    Platelet Count 276 164 - 446 K/uL    MPV 9.7 9.0 - 12.9 fL    Neutrophils-Polys 72.70 (H) 44.00 - 72.00 %    Lymphocytes 15.30 (L) 22.00 - 41.00 %    Monocytes 11.00 0.00 - 13.40 %    Eosinophils 0.20 0.00 - 6.90 %    Basophils 0.50 0.00 - 1.80 %    Immature Granulocytes 0.30 0.00 - 0.90 %    Nucleated RBC 0.00 /100 WBC    Neutrophils (Absolute) 8.11 (H) 2.00 - 7.15 K/uL    Lymphs (Absolute) 1.71 1.00 - 4.80 K/uL    Monos (Absolute) 1.23 (H) 0.00 - 0.85 K/uL    Eos (Absolute) 0.02 0.00 - 0.51 K/uL    Baso (Absolute) 0.06 0.00 - 0.12 K/uL    Immature Granulocytes (abs) 0.03 0.00 - 0.11 K/uL    NRBC (Absolute) 0.00 K/uL   COMP METABOLIC PANEL   Result Value Ref Range    Sodium 135 135 - 145 mmol/L    Potassium 3.9 3.6 - 5.5 mmol/L    Chloride 103 96 - 112 mmol/L    Co2 22 20 - 33 mmol/L    Anion Gap 10.0 0.0 - 11.9    Glucose 110 (H) 65 - 99 mg/dL    Bun 12 8 - 22 mg/dL    Creatinine 0.92 0.50 - 1.40 mg/dL    Calcium 8.7 8.4 - 10.2 mg/dL    AST(SGOT) 16 12 - 45 U/L    ALT(SGPT) 10 2 - 50 U/L    Alkaline Phosphatase 83 30 - 99 U/L    Total Bilirubin 0.8 0.1 -  1.5 mg/dL    Albumin 3.5 3.2 - 4.9 g/dL    Total Protein 6.2 6.0 - 8.2 g/dL    Globulin 2.7 1.9 - 3.5 g/dL    A-G Ratio 1.3 g/dL   URINALYSIS   Result Value Ref Range    Color Yellow     Character Clear     Specific Gravity 1.015 <1.035    Ph 6.5 5.0-8.0    Glucose Negative Negative mg/dL    Ketones Trace (A) Negative mg/dL    Protein Negative Negative mg/dL    Bilirubin Negative Negative    Nitrite Negative Negative    Leukocyte Esterase Negative Negative    Occult Blood Negative Negative    Micro Urine Req see below    ESTIMATED GFR   Result Value Ref Range    GFR If African American >60 >60 mL/min/1.73 m 2    GFR If Non  57 (A) >60 mL/min/1.73 m 2         COURSE & MEDICAL DECISION MAKING  Pertinent Labs & Imaging studies reviewed. (See chart for details)    The patient presents today with generalized weakness. Clinically the patient appears to be dehydrated. She is treated with normal saline fluid resuscitation. Laboratory studies are obtained. Urinalysis is not consistent with infection. Lactic acid is elevated. CBC is remarkable for an elevated white blood cell count. No significant anemia.    Patient presents today with generalized weakness. She meets sepsis criteria. She is treated as per the sepsis protocol with 30 mL's per kilogram of normal saline fluid resuscitation. I do not have a source for the patient's sepsis. Therefore she is treated with ceftriaxone for sepsis of unclear etiology. The patient will be admitted to the hospitalist. I spoke with the on-call hospitalist Dr. Montoya for admission.    FINAL IMPRESSION  1. Weakness    2. Sepsis, due to unspecified organism (CMS-[facility])    3. Volume depletion            Electronically signed by: Jayson Mercado, 2/17/2017 4:28 PM

## 2017-02-18 NOTE — PROGRESS NOTES
0905 Patient came back from CT via bed accompanied by one male Transport. IV ABX patent & infusing. Fall Protocol in effect.

## 2017-02-18 NOTE — PROGRESS NOTES
Received report from day shift RN; care assumed. Pt is sitting up in bed, drinking coffee-- denies any needs. CLIP, pt calling for assistance, fall precautions in place (bed alarm is on), will CTM.

## 2017-02-18 NOTE — PROGRESS NOTES
Pt is oriented to self and time, but thinks she is at [facility] again and that she came for a flu vaccine. Pt is aware that it was raining heavily earlier and that we have been a drought. Pt assisted to turn and reposition-- no obvious extremity weakness notice, but she is generally weak and requires 2 person assistance OOB. Sips of water given and tolerated well. No other needs or concerns. Call light in reach and BA is set.

## 2017-02-18 NOTE — PROGRESS NOTES
1140  Patient's sitting up in bed, having lunch. Dr Montoya visited, spoken to the patient &* daughter (Renee).    1205 Primary Nurse & CNA assisted patient to the BSC, voided & had incontinence, & had a BM, cleaned up patient & cream applied to bottom.

## 2017-02-18 NOTE — PROGRESS NOTES
Tele Summary    Rhythm : Sinus Rhythm  Ectopy :  frequent PAC  HR : 59  LA : 0.16  QRS : 0.08  QT : 0.44

## 2017-02-18 NOTE — PROGRESS NOTES
NO acute changes in pt status. Pt is awake, resting in bed moving from side to side. Incontinent of urine-- provided with ceci-care and linen change. No changes in neuro checks. Call light in reach and bed alarm is set-- monitoring.     Telemetry Report: pt has been SR/SB/sinus arrhythmia, wandering atrial pacemaker  HR: 60s-80s  Measurements: (20/08/38)  Ectopy: rare PVC, f PAC    Measurements and updates per Tyson NAIR.

## 2017-02-18 NOTE — PROGRESS NOTES
0730 Bedside report received from NOC RN (Pat) at the beginning of the shift. Patient's sitting up in bed, having Breakfsat.  IVF patent & infusing. Fall Protocol in effect. Noted confusion, re-oriented. Assessment completed. No distress noted.     0817 Due am medications given.    0850 Patient left for CT via bed accompanied by one male transport. Transport Ticket to ride given.

## 2017-02-18 NOTE — CARE PLAN
Problem: Safety  Goal: Will remain free from injury  Outcome : Progressing as expected.                    Safety precaution in place. Non skid socks in use. Call light within reach. Reminded patient to call for assist. Hourly rounds in effect. Verbalized understanding.    Problem: Knowledge Deficit  Goal: Knowledge of disease process/condition, treatment plan, diagnostic tests, and medications will improve  Outcome : Progressing as expected.                    Discussed Plan of care with the patient daughter (Renee), patient has confusion.  Tests -n CT of the head, SLP< PT & OT Eval. Questions answered. Verbalized                        understanding.

## 2017-02-18 NOTE — PROGRESS NOTES
CT of the head notes a subacute SDH.  I discussed this patient with Dr. Alonso of neurosurgery.  I would like to monitor this patient with serial CT of the head and Q4H neuro checks as this is a subacute injury.  He agrees with plan.  If there are any significant changes in neurologic exam, then I will transfer to [facility] and obtain a formal neurosurgery consultation.  All blood thinners have been stopped.    Reviewed CT results with daughter and discussed plan.  She also confirms that patient's code status is DNR.

## 2017-02-18 NOTE — PROGRESS NOTES
Gave bedside report to NOC nursePat.  Discussed POC.  Pt resting in bed, semi-fowlers, drinking coffee, denies any immediate needs, no s/s of acute distress, all lines patent, IVF infusing, personal belongings w/in reach, safety precautions in place.

## 2017-02-19 LAB
ANION GAP SERPL CALC-SCNC: 9 MMOL/L (ref 0–11.9)
BACTERIA UR CULT: NORMAL
BUN SERPL-MCNC: 7 MG/DL (ref 8–22)
CALCIUM SERPL-MCNC: 8.7 MG/DL (ref 8.4–10.2)
CHLORIDE SERPL-SCNC: 104 MMOL/L (ref 96–112)
CO2 SERPL-SCNC: 23 MMOL/L (ref 20–33)
CREAT SERPL-MCNC: 0.77 MG/DL (ref 0.5–1.4)
EKG IMPRESSION: NORMAL
ERYTHROCYTE [DISTWIDTH] IN BLOOD BY AUTOMATED COUNT: 41 FL (ref 35.9–50)
GFR SERPL CREATININE-BSD FRML MDRD: >60 ML/MIN/1.73 M 2
GLUCOSE SERPL-MCNC: 103 MG/DL (ref 65–99)
HCT VFR BLD AUTO: 35.3 % (ref 37–47)
HGB BLD-MCNC: 12.4 G/DL (ref 12–16)
MAGNESIUM SERPL-MCNC: 1.6 MG/DL (ref 1.5–2.5)
MCH RBC QN AUTO: 30.3 PG (ref 27–33)
MCHC RBC AUTO-ENTMCNC: 35.1 G/DL (ref 33.6–35)
MCV RBC AUTO: 86.3 FL (ref 81.4–97.8)
PLATELET # BLD AUTO: 275 K/UL (ref 164–446)
PMV BLD AUTO: 10.2 FL (ref 9–12.9)
POTASSIUM SERPL-SCNC: 3.9 MMOL/L (ref 3.6–5.5)
RBC # BLD AUTO: 4.09 M/UL (ref 4.2–5.4)
SIGNIFICANT IND 70042: NORMAL
SITE SITE: NORMAL
SODIUM SERPL-SCNC: 136 MMOL/L (ref 135–145)
SOURCE SOURCE: NORMAL
T4 FREE SERPL-MCNC: 1.12 NG/DL (ref 0.58–1.64)
TSH SERPL DL<=0.005 MIU/L-ACNC: 0.09 UIU/ML (ref 0.35–5.5)
WBC # BLD AUTO: 11.6 K/UL (ref 4.8–10.8)

## 2017-02-19 PROCEDURE — 700112 HCHG RX REV CODE 229: Performed by: HOSPITALIST

## 2017-02-19 PROCEDURE — A9270 NON-COVERED ITEM OR SERVICE: HCPCS | Performed by: INTERNAL MEDICINE

## 2017-02-19 PROCEDURE — 93005 ELECTROCARDIOGRAM TRACING: CPT | Performed by: HOSPITALIST

## 2017-02-19 PROCEDURE — A9270 NON-COVERED ITEM OR SERVICE: HCPCS | Performed by: HOSPITALIST

## 2017-02-19 PROCEDURE — 99233 SBSQ HOSP IP/OBS HIGH 50: CPT | Performed by: HOSPITALIST

## 2017-02-19 PROCEDURE — 700102 HCHG RX REV CODE 250 W/ 637 OVERRIDE(OP): Performed by: HOSPITALIST

## 2017-02-19 PROCEDURE — 770020 HCHG ROOM/CARE - TELE (206)

## 2017-02-19 PROCEDURE — 85027 COMPLETE CBC AUTOMATED: CPT

## 2017-02-19 PROCEDURE — 84443 ASSAY THYROID STIM HORMONE: CPT

## 2017-02-19 PROCEDURE — 93005 ELECTROCARDIOGRAM TRACING: CPT | Performed by: INTERNAL MEDICINE

## 2017-02-19 PROCEDURE — 93010 ELECTROCARDIOGRAM REPORT: CPT | Performed by: INTERNAL MEDICINE

## 2017-02-19 PROCEDURE — 83735 ASSAY OF MAGNESIUM: CPT

## 2017-02-19 PROCEDURE — 84439 ASSAY OF FREE THYROXINE: CPT

## 2017-02-19 PROCEDURE — 80048 BASIC METABOLIC PNL TOTAL CA: CPT

## 2017-02-19 PROCEDURE — 700102 HCHG RX REV CODE 250 W/ 637 OVERRIDE(OP): Performed by: INTERNAL MEDICINE

## 2017-02-19 RX ORDER — DILTIAZEM HYDROCHLORIDE 5 MG/ML
10 INJECTION INTRAVENOUS ONCE
Status: DISCONTINUED | OUTPATIENT
Start: 2017-02-19 | End: 2017-02-19

## 2017-02-19 RX ORDER — CLOPIDOGREL BISULFATE 75 MG/1
75 TABLET ORAL DAILY
Status: DISCONTINUED | OUTPATIENT
Start: 2017-02-19 | End: 2017-02-21

## 2017-02-19 RX ORDER — POTASSIUM CHLORIDE 20 MEQ/1
40 TABLET, EXTENDED RELEASE ORAL ONCE
Status: COMPLETED | OUTPATIENT
Start: 2017-02-19 | End: 2017-02-19

## 2017-02-19 RX ADMIN — DOCUSATE SODIUM 100 MG: 100 CAPSULE, LIQUID FILLED ORAL at 21:59

## 2017-02-19 RX ADMIN — ASPIRIN 81 MG: 81 TABLET, COATED ORAL at 16:14

## 2017-02-19 RX ADMIN — CLOPIDOGREL BISULFATE 75 MG: 75 TABLET ORAL at 16:12

## 2017-02-19 RX ADMIN — VENLAFAXINE HYDROCHLORIDE 150 MG: 75 CAPSULE, EXTENDED RELEASE ORAL at 08:56

## 2017-02-19 RX ADMIN — LEVOTHYROXINE SODIUM 100 MCG: 50 TABLET ORAL at 06:15

## 2017-02-19 RX ADMIN — METOPROLOL TARTRATE 25 MG: 25 TABLET ORAL at 13:03

## 2017-02-19 RX ADMIN — ATORVASTATIN CALCIUM 40 MG: 40 TABLET, FILM COATED ORAL at 21:59

## 2017-02-19 RX ADMIN — METOPROLOL TARTRATE 25 MG: 25 TABLET ORAL at 21:59

## 2017-02-19 RX ADMIN — POTASSIUM CHLORIDE 40 MEQ: 1500 TABLET, EXTENDED RELEASE ORAL at 21:59

## 2017-02-19 ASSESSMENT — PAIN SCALES - GENERAL
PAINLEVEL_OUTOF10: 0
PAINLEVEL_OUTOF10: 0

## 2017-02-19 NOTE — PROGRESS NOTES
Pt is oriented to self and that she is at [facility], disoriented to day by one day-- no change in neuro since yesterdays interaction with pt. Continues to be incontinent of urine-- partial linen change, ceci-care and barrier paste provided. Pt moves around in bed frequently but is still assisted to turn. Tolerated pills one at a time without difficulty. Call light in reach and BA is set.

## 2017-02-19 NOTE — PROGRESS NOTES
NO acute changes in pt status. Pt is repositioned without any acute changes or concerns. Call light in reach and bed alarm is set-- monitoring.     Telemetry Report: pt has been SR.  HR: 60s-80s  Measurements: (16/08/40)  Ectopy: f PVC/PAC    Measurements and updates per Tyson NAIR.

## 2017-02-19 NOTE — PROGRESS NOTES
1740 Patient's sitting up in bed, being fed by Primary Nurse with her Dinner. No distress noted. Fall Protocol in effect.

## 2017-02-19 NOTE — CARE PLAN
Problem: Discharge Barriers/Planning  Goal: Patient’s continuum of care needs will be met  Family educated about the POC to have PT/OT evaluation as well as SLP.     Problem: Urinary Elimination:  Goal: Ability to reestablish a normal urinary elimination pattern will improve  Pt checked Q 2hr

## 2017-02-19 NOTE — CARE PLAN
Problem: Safety  Goal: Will remain free from falls  Outcome: PROGRESSING AS EXPECTED  Pt educated regarding the use of call light when needing assistance. Pt demonstrated but is unable to retain new education due to forgetfulness. Fall precautions in place, treaded slippers on, personal belongings/phone in reach. Pt is very unsteady and requires at least X2 person assistance when getting OOB to use commode.     Problem: Urinary Elimination:  Goal: Ability to reestablish a normal urinary elimination pattern will improve  Outcome: PROGRESSING AS EXPECTED    02/19/17 0150   OTHER   Urinary Elimination Incontinence   Pt able to turn self, and needs reminding and support with pillows. Educated regarding q2 hour turning to decrease possibility of skin breakdown. Pt assisted to turn but is frequently thrashing around in bed and repositioning herself.  No new skin break down noted over bony prominences.

## 2017-02-19 NOTE — PROGRESS NOTES
Hospital Medicine Progress Note, Adult, Complex               Author: Domonique Montoya Date & Time created: 2/19/2017  12:16 PM     CC: 89F presented with weakness, confusion and difficulty with ambulation and initially admitted for suspected occult infection with SIRs and dehydration but subsequently found to have subacute subdural hematoma    Interval History:  2/18 - Clinically unchanged, still forgetful, serial CT shows stable SDH  2/19 - Patient converted to AFIB today, review of records shows no history of this.  Discussed at length at bedside with son regarding decision to fully treat.  Awaiting daughter to determine if patient will start on anticoagulation.    Review of Systems:  Review of Systems   Unable to perform ROS: dementia       Physical Exam:  Physical Exam   Constitutional: No distress.   Frail, elderly   HENT:   Head: Normocephalic and atraumatic.   Right Ear: External ear normal.   Left Ear: External ear normal.   Eyes: EOM are normal. Right eye exhibits no discharge. Left eye exhibits no discharge.   Neck: Neck supple. No JVD present.   Cardiovascular: Normal rate, regular rhythm and normal heart sounds.    Pulmonary/Chest: Effort normal and breath sounds normal. No respiratory distress. She exhibits no tenderness.   Abdominal: Soft. Bowel sounds are normal. She exhibits no distension. There is no tenderness.   Musculoskeletal: Normal range of motion. She exhibits no edema.   Neurological: She is alert. No cranial nerve deficit.   Skin: Skin is warm and dry. She is not diaphoretic. No erythema.   Psychiatric: Cognition and memory are impaired. She exhibits abnormal recent memory.   Nursing note and vitals reviewed.      Labs:  Recent Labs      02/18/17   0025  02/18/17   0418  02/18/17   0757   ISTATSPEC  Venous  Venous  Venous         Recent Labs      02/17/17   1355  02/18/17   0418  02/19/17   0621   SODIUM  135  135  136   POTASSIUM  3.9  3.7  3.9   CHLORIDE  103  106  104   CO2  22  23  23    BUN  12  7*  7*   CREATININE  0.92  0.84  0.77   CALCIUM  8.7  8.2*  8.7     Recent Labs      17   1355  17   0418  17   0621   ALTSGPT  10   --    --    ASTSGOT  16   --    --    ALKPHOSPHAT  83   --    --    TBILIRUBIN  0.8   --    --    GLUCOSE  110*  90  103*     Recent Labs      17   1355  17   0418  17   0621   RBC  4.21  3.76*  4.09*   HEMOGLOBIN  12.8  11.4*  12.4   HEMATOCRIT  38.5  33.4*  35.3*   PLATELETCT  276  242  275     Recent Labs      17   1355  17   0418  17   0621   WBC  11.2*  8.9  11.6*   NEUTSPOLYS  72.70*   --    --    LYMPHOCYTES  15.30*   --    --    MONOCYTES  11.00   --    --    EOSINOPHILS  0.20   --    --    BASOPHILS  0.50   --    --    ASTSGOT  16   --    --    ALTSGPT  10   --    --    ALKPHOSPHAT  83   --    --    TBILIRUBIN  0.8   --    --            Hemodynamics:  Temp (24hrs), Av.7 °C (98.1 °F), Min:36.4 °C (97.5 °F), Max:37 °C (98.6 °F)  Temperature: 36.8 °C (98.2 °F)  Pulse  Av.6  Min: 54  Max: 96   Blood Pressure : 122/53 mmHg     Respiratory:    Respiration: 18, Pulse Oximetry: 92 %        RLL Breath Sounds: Diminished, LLL Breath Sounds: Diminished  Fluids:    Intake/Output Summary (Last 24 hours) at 17 1216  Last data filed at 17 1725   Gross per 24 hour   Intake   1100 ml   Output      0 ml   Net   1100 ml        GI/Nutrition:  Orders Placed This Encounter   Procedures   • Diet Order     Standing Status: Standing      Number of Occurrences: 1      Standing Expiration Date:      Order Specific Question:  Diet:     Answer:  Regular [1]     Medical Decision Making, by Problem:  Active Hospital Problems    Diagnosis   • SDH (subdural hematoma) (CMS-HCC) [I62.00]  - Serial CT scan shows stable subacute SDH  - Per daughter, patient had recorded falls the day prior to admission, in mid January and last September, suspect SDH from fall in January  - Will resume plavix and ASA now as repeat CT shows no  active bleed and patient is >4 weeks from the fall that is suspected to have caused the SDH  - No further serial CTs unless new symptoms  - Continue Q4H neuro exams  - Continue HOB elevation  - Patient previous discussed with Dr. Alonso of neurosurgery and patient can follow up outpt  - PT/OT and SLP for cognitive eval     • CVA  - Diagnosed last year and completed rehab, had CTA neck which showed right ICA stenosis and outpt vascular surgery was recommended  - Was previously on Plavix and lipitor, at admission only plavix and ASA were on the med rec  - Will restart Plavix and ASA as no new bleed noted on repeat CT head  - Restarted statin, unsure why DCd since last April     • AFIB  - New onset 2/16  - Patient quite complicated, she has history of carotid artery disease and in light of AFIB she has high risk of embolic event  - Patient wound need to start on coumadin and lovenox which would be safe at this point as CT head is stable and shows no new bleed  - However, patient also has history of multiple falls and as noted above already has a subacute SDH  - Discussed at length at bedside with son who would like to discuss with his sister  - I will restart plavix and ASA as noted above  - Start metoprolol for rate control  - Last ECHO from April was technically difficult but no significant valvular disease was noted  - Will discuss with cards     • SIRS (systemic inflammatory response syndrome) (CMS-HCC) [R65.10] with Hypotension [I95.9]  - Resolved  - Lactic acidosis resolved  - No infection declared itself after correction of dehydration  - CXR x 2 negative  - UA negative and culture no growth  - Stopped abx 2/18 and monitor     • Dementia  - Multifactorial  - Family reports worsening since 2014  - History of EtOH abuse plus CVA last April now confounded by SDH  - Continue monitoring and reorientation     • Hypothyroid [E03.9]  - Synthroid     • Dyslipidemia [E78.5]  - Restarted statin, unsure why DCd since last  April       Medications reviewed and Labs reviewed

## 2017-02-19 NOTE — DISCHARGE PLANNING
Patient discussed in morning rounds.  Patient reportedly lives at [facility] in [facility] and the discharge plan is for her to return home when medically able.  No current SS needs noted.

## 2017-02-19 NOTE — PROGRESS NOTES
Received report from day shift RN; care assumed. Pt sitting in bed awake, no change from yesterday's neuro check. CLIP, pt calling for assistance, fall precautions in place (bed alarm is set), will CTM.

## 2017-02-20 ENCOUNTER — APPOINTMENT (OUTPATIENT)
Dept: RADIOLOGY | Facility: MEDICAL CENTER | Age: 82
DRG: 064 | End: 2017-02-20
Attending: HOSPITALIST
Payer: MEDICARE

## 2017-02-20 LAB
ERYTHROCYTE [DISTWIDTH] IN BLOOD BY AUTOMATED COUNT: 40 FL (ref 35.9–50)
HCT VFR BLD AUTO: 36.8 % (ref 37–47)
HGB BLD-MCNC: 12.8 G/DL (ref 12–16)
LV EJECT FRACT  99904: 70
LV EJECT FRACT MOD 2C 99903: 76.41
LV EJECT FRACT MOD 4C 99902: 76.69
LV EJECT FRACT MOD BP 99901: 75.88
MCH RBC QN AUTO: 30 PG (ref 27–33)
MCHC RBC AUTO-ENTMCNC: 34.8 G/DL (ref 33.6–35)
MCV RBC AUTO: 86.4 FL (ref 81.4–97.8)
PLATELET # BLD AUTO: 306 K/UL (ref 164–446)
PMV BLD AUTO: 10.4 FL (ref 9–12.9)
RBC # BLD AUTO: 4.26 M/UL (ref 4.2–5.4)
WBC # BLD AUTO: 12.7 K/UL (ref 4.8–10.8)

## 2017-02-20 PROCEDURE — 92610 EVALUATE SWALLOWING FUNCTION: CPT

## 2017-02-20 PROCEDURE — 93306 TTE W/DOPPLER COMPLETE: CPT | Mod: 26 | Performed by: INTERNAL MEDICINE

## 2017-02-20 PROCEDURE — 93306 TTE W/DOPPLER COMPLETE: CPT

## 2017-02-20 PROCEDURE — 700111 HCHG RX REV CODE 636 W/ 250 OVERRIDE (IP): Performed by: HOSPITALIST

## 2017-02-20 PROCEDURE — 92523 SPEECH SOUND LANG COMPREHEN: CPT

## 2017-02-20 PROCEDURE — 93010 ELECTROCARDIOGRAM REPORT: CPT | Performed by: INTERNAL MEDICINE

## 2017-02-20 PROCEDURE — 70450 CT HEAD/BRAIN W/O DYE: CPT

## 2017-02-20 PROCEDURE — 85027 COMPLETE CBC AUTOMATED: CPT

## 2017-02-20 PROCEDURE — 99233 SBSQ HOSP IP/OBS HIGH 50: CPT | Performed by: HOSPITALIST

## 2017-02-20 PROCEDURE — 700102 HCHG RX REV CODE 250 W/ 637 OVERRIDE(OP): Performed by: HOSPITALIST

## 2017-02-20 PROCEDURE — G8978 MOBILITY CURRENT STATUS: HCPCS | Mod: CL

## 2017-02-20 PROCEDURE — 71010 DX-CHEST-LIMITED (1 VIEW): CPT

## 2017-02-20 PROCEDURE — 97162 PT EVAL MOD COMPLEX 30 MIN: CPT

## 2017-02-20 PROCEDURE — G9165 ATTEN CURRENT STATUS: HCPCS | Mod: CK

## 2017-02-20 PROCEDURE — G8987 SELF CARE CURRENT STATUS: HCPCS | Mod: CL

## 2017-02-20 PROCEDURE — 770020 HCHG ROOM/CARE - TELE (206)

## 2017-02-20 PROCEDURE — 700105 HCHG RX REV CODE 258: Performed by: HOSPITALIST

## 2017-02-20 PROCEDURE — G9166 ATTEN GOAL STATUS: HCPCS | Mod: CI

## 2017-02-20 PROCEDURE — G8988 SELF CARE GOAL STATUS: HCPCS | Mod: CK

## 2017-02-20 PROCEDURE — G8979 MOBILITY GOAL STATUS: HCPCS | Mod: CJ

## 2017-02-20 PROCEDURE — 97165 OT EVAL LOW COMPLEX 30 MIN: CPT

## 2017-02-20 PROCEDURE — A9270 NON-COVERED ITEM OR SERVICE: HCPCS | Performed by: HOSPITALIST

## 2017-02-20 PROCEDURE — 700112 HCHG RX REV CODE 229: Performed by: HOSPITALIST

## 2017-02-20 RX ADMIN — CEFTRIAXONE 2 G: 2 INJECTION, POWDER, FOR SOLUTION INTRAMUSCULAR; INTRAVENOUS at 13:15

## 2017-02-20 RX ADMIN — ATORVASTATIN CALCIUM 40 MG: 40 TABLET, FILM COATED ORAL at 20:19

## 2017-02-20 RX ADMIN — ASPIRIN 81 MG: 81 TABLET, COATED ORAL at 08:53

## 2017-02-20 RX ADMIN — LEVOTHYROXINE SODIUM 100 MCG: 50 TABLET ORAL at 06:40

## 2017-02-20 RX ADMIN — DOCUSATE SODIUM 100 MG: 100 CAPSULE, LIQUID FILLED ORAL at 20:19

## 2017-02-20 RX ADMIN — DEXTROSE MONOHYDRATE 500 MG: 50 INJECTION, SOLUTION INTRAVENOUS at 14:01

## 2017-02-20 RX ADMIN — METOPROLOL TARTRATE 25 MG: 25 TABLET ORAL at 20:19

## 2017-02-20 RX ADMIN — CLOPIDOGREL BISULFATE 75 MG: 75 TABLET ORAL at 08:55

## 2017-02-20 RX ADMIN — METOPROLOL TARTRATE 25 MG: 25 TABLET ORAL at 08:56

## 2017-02-20 ASSESSMENT — PAIN SCALES - GENERAL
PAINLEVEL_OUTOF10: 0

## 2017-02-20 ASSESSMENT — GAIT ASSESSMENTS: GAIT LEVEL OF ASSIST: UNABLE TO PARTICIPATE

## 2017-02-20 NOTE — THERAPY
"  Speech Language Therapy Clinical Swallow Evaluation completed.  Functional Status: Bedside swallow evaluation completed this date. Patient sleepy, but able to wake up and participate with max verbal/tactile cues. Patient's family present. Patient presents with symptoms of mild-moderate oral-pharyngeal dysphagia. Laryngeal elevation upon palpation and swallow trigger delayed 2-3 seconds, increasing risk of aspiration. Patient also presents with prolonged and effortful mastication with pocketing secondary to reduced lingual range of motion and strength. Patient consumed thin liquids, nectar thick liquids, pureed solids, and soft chopped solids with no overt signs of aspiration. Patient will need 1:1 assistance and she was unable to feed herself during evaluation. Family reports patient was feeding herself at breakfast and was coughing occasionally. Recommend Dysphagia 3/thin liquid diet with 1:1 assistance. Also recommend speech therapy for oral motor exercises and safe swallow strategy training.   Recommendations - Diet: Diet / Liquid Recommendation: Dysphagia III, Thin Liquid                          Strategies: Direct supervision during meals and Head of Bed at 90 Degrees                          Medication Administration: Medication Administration : Whole with Liquid Wash  Plan of Care: Will benefit from Speech Therapy 5 times per week    See \"Rehab Therapy-Acute\" Patient Summary Report for complete documentation.   "

## 2017-02-20 NOTE — THERAPY
"Speech Language Therapy Evaluation completed to address speech, communication and cognition  Functional Status: Cognitive-linguistic evaluation partially completed this date. Patient lethargic sitting up in chair. Family present also. They report patient has dementia, but that cognitive and mobility skills have declined significantly since 2/17/17. Patient was disoriented, but able to have conversations, follow directions, and walk herself to the dining room before prior to 2/17/17. Patient presents with severe cognitive-linguistic deficits today. Patient's eyes were closed and she had difficulty participating. She needed max verbal/tactile stim to wake up and participate. Patient was able to answer simple yes/no questions regarding immediate needs and surroundings, but she had difficulty following simple 1-step commands. Patient did not initiate any conversation or make eye contact. Patient was oriented to name and city only.    Recommendations: Speech therapist will attempt to further assess cognitive-linguistic assessment tomorrow if patient is able to participate.  Plan of Care: Will benefit from Speech Therapy 5 times per week    See \"Rehab Therapy-Acute\" Patient Summary Report for complete documentation.   "

## 2017-02-20 NOTE — PROGRESS NOTES
Report received from Deloris RN, patient laying comfortably in bed and denies any needs at this time, safety precautions in place, CLIP

## 2017-02-20 NOTE — CONSULTS
Cardiology Consult Note:    Satnam Hogan  Date & Time note created:    2/19/2017   7:38 PM     Referring MD:  Dr. Montoya    Patient ID:   Name:             Erlinda Chowdhury   YOB: 1926  Age:                 89 y.o.  female   MRN:               [id number]                                                             Chief Complaint:      New AFib    History of Present Illness:    89 y/o femaleinitially admitted for suspected occult infection with SIRs and dehydration but subsequently found to have subacute subdural hematoma , found to have new AFib  Some confusion and weakness on admission;  H/o hypothyroidism, HLD, Spinal stenosis EtOH use  Review of Systems:      Constitutional: Denies fevers, Denies weight changes  Eyes: Denies changes in vision, no eye pain  Ears/Nose/Throat/Mouth: Denies nasal congestion or sore throat   Cardiovascular: - chest pain, ? palpitations   Respiratory: - shortness of breath , Denies cough  Gastrointestinal/Hepatic: Denies abdominal pain, nausea, vomiting, diarrhea, constipation or GI bleeding   Genitourinary: Denies dysuria or frequency  Musculoskeletal/Rheum: Denies  joint pain and swelling   Skin: Denies rash  Neurological: Denies headache, confusion, memory loss or focal weakness/parasthesias  Psychiatric: denies mood disorder   Endocrine: Es thyroid problems  Heme/Oncology/Lymph Nodes: Denies enlarged lymph nodes, denies brusing or known bleeding disorder  All other systems were reviewed and are negative (AMA/CMS criteria)                Past Medical History:   Past Medical History   Diagnosis Date   • Hypothyroid 5/20/2009   • S/P HIRAL-BSO 5/20/2009   • Chronic low back pain 5/20/2009   • Macular degeneration 5/20/2009   • CATARACT 5/20/2009   • Dyslipidemia 5/20/2009   • Preventative health care 5/20/2009   • Spinal stenosis      Active Hospital Problems    Diagnosis   • SDH (subdural hematoma) (CMS-HCC) [I62.00]     Priority: High   • SIRS (systemic  inflammatory response syndrome) (CMS-HCC) [R65.10]     Priority: High   • Hypotension [I95.9]   • History of hypertension [Z86.79]   • Hypothyroid [E03.9]   • Dyslipidemia [E78.5]       Past Surgical History:  Past Surgical History   Procedure Laterality Date   • Lumbar laminectomy diskectomy  5/25/2009     Performed by RENAY WANG at SURGERY [facility]   • Hysterectomy laparoscopy     • Cataract phaco with iol  9/29/2014     Performed by Edi Lombardo M.D. at SURGERY SAME DAY [facility] Medications:    Current facility-administered medications:   •  metoprolol (LOPRESSOR) tablet 25 mg, 25 mg, Oral, TWICE DAILY, Domonique Montyoa M.D., 25 mg at 02/19/17 1303  •  aspirin EC (ECOTRIN) tablet 81 mg, 81 mg, Oral, QAM, Domonique Montoya M.D., 81 mg at 02/19/17 1614  •  clopidogrel (PLAVIX) tablet 75 mg, 75 mg, Oral, DAILY, Domonique Montoya M.D., 75 mg at 02/19/17 1612  •  atorvastatin (LIPITOR) tablet 40 mg, 40 mg, Oral, QHS, Domonique Montoya M.D., 40 mg at 02/18/17 2106  •  NS infusion 1,701 mL, 30 mL/kg, Intravenous, Once PRN, Domonique Montoya M.D.  •  docusate sodium (COLACE) capsule 100 mg, 100 mg, Oral, BID, Domonique Montoya M.D., 100 mg at 02/18/17 2106  •  Respiratory Care per Protocol, , Nebulization, Continuous RT, Domonique Mnotoya M.D.  •  NS (BOLUS) infusion 500 mL, 500 mL, Intravenous, Once PRN, Domonique Montoya M.D.  •  levothyroxine (SYNTHROID) tablet 100 mcg, 100 mcg, Oral, AM ES, Domonique Montoya M.D., 100 mcg at 02/19/17 0615  •  venlafaxine XR (EFFEXOR XR) capsule 150 mg, 150 mg, Oral, DAILY, Domonique Montoya M.D., 150 mg at 02/19/17 0856    Current Outpatient Medications:  Prescriptions prior to admission   Medication Sig Dispense Refill Last Dose   • aspirin EC (ECOTRIN) 81 MG Tablet Delayed Response Take 81 mg by mouth every morning.   2/17/2017 at 0800   • Cranberry 450 MG Tab Take 1 Tab by mouth every morning.   2/17/2017 at 0800   • folic acid (FOLVITE) 800 MCG tablet Take 800 mcg by mouth  "every morning.   2/17/2017 at 0800   • latanoprost (XALATAN) 0.005 % Solution Place 1 Drop in both eyes every evening.   2/16/2017 at 2000   • silver sulfADIAZINE (SILVADENE) 1 % Cream Apply 1 g to affected area(s) 3 times a week.   2/17/2017 at 0800   • acetaminophen (TYLENOL) 325 MG Tab Take 650 mg by mouth every 6 hours as needed for Mild Pain or Moderate Pain.   2/17/2017 at 0929   • venlafaxine (EFFEXOR-XR) 150 MG extended-release capsule Take 1 Cap by mouth every day. 30 Cap 6 2/17/2017 at 0800   • clopidogrel (PLAVIX) 75 MG Tab Take 1 Tab by mouth every day. 90 Tab 3 2/17/2017 at 0800   • levothyroxine (SYNTHROID) 100 MCG Tab Take 1 Tab by mouth Every morning on an empty stomach. 90 Tab 3 2/17/2017 at 0600   • vitamin D, Ergocalciferol, (DRISDOL) 37422 UNITS Cap capsule Take 1 Cap by mouth every 7 days. 12 Cap 4 2/11/2017 at 0800   • Multiple Vitamins-Minerals (MULTILEX-T&M) Tab Take 1 Tab by mouth every day. 90 Tab 3 2/17/2017 at 0800       Medication Allergy:  Allergies   Allergen Reactions   • Codeine Vomiting     \"I get loopy\"   • Quinine Vomiting   • Vicodin [Hydrocodone-Acetaminophen]        Family History:  Family History   Problem Relation Age of Onset   • Heart Disease Father    • Cancer Sister      breast cancer       Social History:  Social History     Social History   • Marital Status:      Spouse Name: N/A   • Number of Children: N/A   • Years of Education: N/A     Occupational History   • Not on file.     Social History Main Topics   • Smoking status: Never Smoker    • Smokeless tobacco: Never Used   • Alcohol Use: 4.2 oz/week     7 Standard drinks or equivalent per week      Comment: 7  per week/ 1 a day   • Drug Use: No   • Sexual Activity: Not on file     Other Topics Concern   • Not on file     Social History Narrative         Physical Exam:  Vitals  Weight/BMI: Body mass index is 22.81 kg/(m^2).  Blood pressure 125/71, pulse 71, temperature 36.5 °C (97.7 °F), resp. rate 18, height " "1.626 m (5' 4\"), weight 60.3 kg (132 lb 15 oz), SpO2 95 %.  Filed Vitals:    02/19/17 0450 02/19/17 0756 02/19/17 1200 02/19/17 1600   BP: 112/58 122/53 148/83 125/71   Pulse: 69 63 70 71   Temp: 36.9 °C (98.5 °F) 36.8 °C (98.2 °F) 36.6 °C (97.9 °F) 36.5 °C (97.7 °F)   Resp: 18  18 18   Height:       Weight:       SpO2: 90% 92% 92% 95%     Oxygen Therapy:  Pulse Oximetry: 95 %, O2 (LPM): 0, O2 Delivery: None (Room Air)  General Appearance:  Thin;  Well developed, Poorly nourished, No acute distress, Ill appearance.   HENT:  Normocephalic, Atraumatic, Oropharynx moist mucous membranes, Dentition: , Nose normal.    Eyes:  PERRLA, EOMI, Conjunctiva normal, No discharge.  Neck:  Normal range of motion, No cervical tenderness, Supple, No stridor, no JVD .  No thyromegaly.  No carotid bruit.  Cardiovascular:  Normal heart rate, Normal rhythm,  S1, S2, no S3, S4; No gallops; No murmurs, No rubs, .   Extremitites with intact distal pulses, no cyanosis, clubbing or edema.  No heaves, thrills, HJR;  Peripheral pulses: carotid 2+, brachial 2+, radial 2+, ulnar 2+, femoral 2+, popliteal 2+, PT 2+, DP 2+;  Lungs:  Respiratory effort is normal. Normal breath sounds, breath sounds clear to auscultation bilaterally,  no rales, no rhonchi, no wheezing.   Abdomen: Bowel sounds normal, Soft, No tenderness, No guarding, No rebound, No masses, No hepatosplenomegaly.  Skin: Warm, Dry, No erythema, No rash, no induration or crepitus.  Neurologic: slow; Confused  Psychiatric: Affect normal, Judgment ?, Mood normal.      MDM (Data Review):     Records reviewed and summarized in current documentation    Lab Data Review:  Recent Results (from the past 24 hour(s))   CBC without Differential    Collection Time: 02/19/17  6:21 AM   Result Value Ref Range    WBC 11.6 (H) 4.8 - 10.8 K/uL    RBC 4.09 (L) 4.20 - 5.40 M/uL    Hemoglobin 12.4 12.0 - 16.0 g/dL    Hematocrit 35.3 (L) 37.0 - 47.0 %    MCV 86.3 81.4 - 97.8 fL    MCH 30.3 27.0 - 33.0 pg    " MCHC 35.1 (H) 33.6 - 35.0 g/dL    RDW 41.0 35.9 - 50.0 fL    Platelet Count 275 164 - 446 K/uL    MPV 10.2 9.0 - 12.9 fL   Basic Metabolic Panel (BMP)    Collection Time: 17  6:21 AM   Result Value Ref Range    Sodium 136 135 - 145 mmol/L    Potassium 3.9 3.6 - 5.5 mmol/L    Chloride 104 96 - 112 mmol/L    Co2 23 20 - 33 mmol/L    Glucose 103 (H) 65 - 99 mg/dL    Bun 7 (L) 8 - 22 mg/dL    Creatinine 0.77 0.50 - 1.40 mg/dL    Calcium 8.7 8.4 - 10.2 mg/dL    Anion Gap 9.0 0.0 - 11.9   ESTIMATED GFR    Collection Time: 17  6:21 AM   Result Value Ref Range    GFR If African American >60 >60 mL/min/1.73 m 2    GFR If Non African American >60 >60 mL/min/1.73 m 2   EKG    Collection Time: 17 11:04 AM   Result Value Ref Range    Report       [facility]    Test Date:  2017  Pt Name:    KATIA CARROLL              Department: MED  MRN:        [id number]                      Room:       3312  Gender:     F                            Technician: SAMMY  :        [id number]                   Requested By:TOMÁS POOLE  Order #:    [id number]                    Reading MD: Bereket Orozco MD    Measurements  Intervals                                Axis  Rate:       109                          P:  VA:                                      QRS:        16  QRSD:       82                           T:          49  QT:         332  QTc:        448    Interpretive Statements  PACEMAKER SPIKES OR ARTIFACTS  ATRIAL FIBRILLATION  LOW VOLTAGE THROUGHOUT  Compared to ECG 2016 13:27:37  Low QRS voltage now present  Sinus rhythm no longer present  Myocardial infarct finding no longer present    Electronically Signed On 2017 16:12:50 PST by Bereket Orozco MD         Imaging/Procedures Review:    Chest Xray:  Reviewed    EKG:   As in HPI. ATRIAL FIBRILLATION , 106 bpm  LOW VOLTAGE THROUGHOUT    MDM (Assessment and Plan):     Active Hospital Problems    Diagnosis   • SDH (subdural hematoma) (CMS-HCC)  [I62.00]     Priority: High   • SIRS (systemic inflammatory response syndrome) (CMS-HCC) [R65.10]     Priority: High   • Hypotension [I95.9]   • History of hypertension [Z86.79]   • Hypothyroid [E03.9]   • Dyslipidemia [E78.5]     Currently she has self converted back to SR 70'2  Contra-indicated for anticoagulation with subdural hematoma  Check Mg++, TFT, and keep K+>4.0, Mg++>2.2  Echocardiogram ordered for tomorrow    Will follow  Thx

## 2017-02-20 NOTE — PROGRESS NOTES
Report given to Day Shift RN Aye, patient laying comfortably in bed and has no complaints at this time, safety precautions in place, CLIP

## 2017-02-20 NOTE — PROGRESS NOTES
Assessment completed, medicated per mar. Denies needs at this time. Family at bedside helping with breakfast. Pt aaox2, self and place ([facility]). Bed alarm on. CLIP.

## 2017-02-20 NOTE — PROGRESS NOTES
EKG performed at bedside, neuro checks assessed, patient is still A&O x 2, neuro checks remain the same, safety precautions in place, CLIP

## 2017-02-20 NOTE — THERAPY
"Physical Therapy Evaluation completed.   Bed Mobility:  Supine to Sit: Maximal Assist (x2)  Transfers: Sit to Stand: Maximal Assist  Gait: Level Of Assist: Unable to Participate   Plan of Care: Will benefit from Physical Therapy 5 times per week  Discharge Recommendations: Equipment: Will Continue to Assess for Equipment Needs. Post-acute therapy recommended before discharged home.    See \"Rehab Therapy-Acute\" Patient Summary Report for complete documentation.     "

## 2017-02-20 NOTE — PROGRESS NOTES
Patient back from CT and is now laying comfortably in bed with no signs of distress noted    Tele strip at 1927 shows Sinus rhythm with a HR of 78.      Measurements: .20/.08/.38    Tele Shift Summary:    Rhythm : Sinus rhythm with a wandering atrial pace  Rate : 60s to 80s    Ectopy : Per CCT Mariah, pt had frequent PACs and occasional PVCs    Telemetry monitoring strips placed in pt chart.

## 2017-02-20 NOTE — PROGRESS NOTES
Hospital Medicine Progress Note, Adult, Complex               Author: Domonique Montoya Date & Time created: 2/20/2017  11:17 AM     CC: 89F presented with weakness, confusion and difficulty with ambulation and initially admitted for suspected occult infection with SIRs and dehydration but subsequently found to have subacute subdural hematoma    Interval History:  2/18 - Clinically unchanged, still forgetful, serial CT shows stable SDH  2/19 - Patient converted to AFIB today, review of records shows no history of this.  Discussed at length at bedside with son regarding decision to fully treat.  Awaiting daughter to determine if patient will start on anticoagulation.  2/20 - Spontaneous conversion to NSR yesterday, agitated with mentation changes overnight and stat CT head obtained    Review of Systems:  Review of Systems   Unable to perform ROS: dementia       Physical Exam:  Physical Exam   Constitutional: No distress.   Frail, elderly   HENT:   Head: Normocephalic and atraumatic.   Right Ear: External ear normal.   Left Ear: External ear normal.   Eyes: EOM are normal. Right eye exhibits no discharge. Left eye exhibits no discharge.   Neck: Neck supple. No JVD present.   Cardiovascular: Normal rate, regular rhythm and normal heart sounds.    Pulmonary/Chest: Effort normal and breath sounds normal. No respiratory distress. She exhibits no tenderness.   Abdominal: Soft. Bowel sounds are normal. She exhibits no distension. There is no tenderness.   Musculoskeletal: Normal range of motion. She exhibits no edema.   Neurological: She is alert. No cranial nerve deficit.   Skin: Skin is warm and dry. She is not diaphoretic. No erythema.   Psychiatric: Cognition and memory are impaired. She exhibits abnormal recent memory.   Nursing note and vitals reviewed.      Labs:  Recent Labs      02/18/17   0025  02/18/17   0418  02/18/17   0757   ISTATSPEC  Venous  Venous  Venous         Recent Labs      02/17/17   1355  02/18/17    1721   SODIUM  135  135   --   136   POTASSIUM  3.9  3.7   --   3.9   CHLORIDE  103  106   --   104   CO2  22  23   --   23   BUN  12  7*   --   7*   CREATININE  0.92  0.84   --   0.77   MAGNESIUM   --    --   1.6   --    CALCIUM  8.7  8.2*   --   8.7     Recent Labs      17   1355  17   ALTSGPT  10   --    --    ASTSGOT  16   --    --    ALKPHOSPHAT  83   --    --    TBILIRUBIN  0.8   --    --    GLUCOSE  110*  90  103*     Recent Labs      17   0500   RBC  3.76*  4.09*  4.26   HEMOGLOBIN  11.4*  12.4  12.8   HEMATOCRIT  33.4*  35.3*  36.8*   PLATELETCT  242  275  306     Recent Labs      17   0500   WBC  11.2*  8.9  11.6*  12.7*   NEUTSPOLYS  72.70*   --    --    --    LYMPHOCYTES  15.30*   --    --    --    MONOCYTES  11.00   --    --    --    EOSINOPHILS  0.20   --    --    --    BASOPHILS  0.50   --    --    --    ASTSGOT  16   --    --    --    ALTSGPT  10   --    --    --    ALKPHOSPHAT  83   --    --    --    TBILIRUBIN  0.8   --    --    --            Hemodynamics:  Temp (24hrs), Av.7 °C (98 °F), Min:36.4 °C (97.6 °F), Max:36.8 °C (98.3 °F)  Temperature: 36.4 °C (97.6 °F)  Pulse  Av.6  Min: 54  Max: 96   Blood Pressure : 130/76 mmHg     Respiratory:    Respiration: 18, Pulse Oximetry: 94 %        RLL Breath Sounds: Diminished, LLL Breath Sounds: Diminished  Fluids:  No intake or output data in the 24 hours ending 17 1117     GI/Nutrition:  Orders Placed This Encounter   Procedures   • Diet Order     Standing Status: Standing      Number of Occurrences: 1      Standing Expiration Date:      Order Specific Question:  Diet:     Answer:  Regular [1]     Medical Decision Making, by Problem:  Active Hospital Problems    Diagnosis   • SDH (subdural hematoma) (CMS-HCC) [I62.00]  - Serial CT scan shows stable subacute SDH or new bleed  (2/17, 2/18, 2/20)  - Per daughter, patient had recorded falls the day prior to admission, in mid January and last September, suspect SDH from fall in January  - Resumed plavix and ASA as repeat CTs show no active bleed and patient is >4 weeks from the fall that is suspected to have caused the SDH  - No further serial CTs unless new symptoms  - Continue Q4H neuro exams  - Continue HOB elevation  - Patient previous discussed with Dr. Alonso of neurosurgery and patient can follow up outpt  - PT/OT and SLP for cognitive eval     • CVA  - Diagnosed last year and completed rehab, had CTA neck which showed right ICA stenosis and outpt vascular surgery was recommended, per daughter, was seen by Dr. Driscoll and no intervention needed  - Restarted Plavix and ASA as no new bleed noted on repeat CT head  - Restarted statin     • pAFIB  - New onset 2/19, converted back to NSR 2/19  - Patient quite complicated, she has history of carotid artery disease and in light of AFIB she has high risk of embolic event  - Patient has history of multiple falls and as noted above already has a subacute SDH  - Restarted plavix and ASA as noted above  - Continue metoprolol for rate control  - Repeat ECHO pending  - Cards following and they recommend no anticoagulation     • SIRS (systemic inflammatory response syndrome) (CMS-HCC) [R65.10] with Hypotension [I95.9]  - Lactic acidosis resolved  - Leukocytsis has reoccured  - No infection declared itself after correction of dehydration  - CXR x 2 negative and UA negative and culture no growth  - Stopped abx 2/18  - In light of worsening leukocytosis, will repeat CXR and UA and restart empiric C3 and Azithro for now     • Dementia  - Multifactorial  - Family reports worsening since 2014  - History of EtOH abuse plus CVA last April now confounded by SDH  - Continue monitoring and reorientation     • Hypothyroid [E03.9]  - Synthroid     • Dyslipidemia [E78.5]  - Restarted statin     DISPO: Patient with  poor prognosis, holly conversation with family and they are agreeable to hospice consultation.  She would qualify based on poor palliative performance score.  PT/OT/SLP pending.    Medications reviewed and Labs reviewed

## 2017-02-20 NOTE — PROGRESS NOTES
monito summary shift change until 10AM SB-SR 0.14/.01/0.44 converting to a-fib, back in SR at 1430.

## 2017-02-20 NOTE — PROGRESS NOTES
Pt back to bed with help of CNA and student RN. Pt resting comfortably at this time. IV antibiotic running.

## 2017-02-20 NOTE — PROGRESS NOTES
Assessments performed, due medications administered per MAR, patient is A&O x 2 (time and self), patient denies any pain at this time, safety precautions in place, CLIP

## 2017-02-20 NOTE — PROGRESS NOTES
MD paged to update about patient's decline in neurological status, orders received for CT of head W/O, orders read back to Dr. Torres

## 2017-02-20 NOTE — PROGRESS NOTES
Patient resting on and off in bed, neuro checks performed and remains the same at this time, patient is still A&O x 4, patient repositioned in bed, safety precautions in place, CLIP

## 2017-02-20 NOTE — PROGRESS NOTES
"Cardiology Hospital Progress Note    DOS: 2/20/2017    Chief complaint/Reason for consult: pAF    Interval History:  No acute events overnight. No complaints this AM.    ROS (+ highlighted in red):  Constitutional: Fevers/chills/fatigue/weightloss  Respiratory: Shortness of breath/cough  Cardiovascular: Chest pain/palpitations/edema/orthopnea/syncope    Past Medical History   Diagnosis Date   • Hypothyroid 5/20/2009   • S/P HIRAL-BSO 5/20/2009   • Chronic low back pain 5/20/2009   • Macular degeneration 5/20/2009   • CATARACT 5/20/2009   • Dyslipidemia 5/20/2009   • Preventative health care 5/20/2009   • Spinal stenosis        Allergies   Allergen Reactions   • Codeine Vomiting     \"I get loopy\"   • Quinine Vomiting   • Vicodin [Hydrocodone-Acetaminophen]        Current Facility-Administered Medications   Medication Dose Route Frequency Provider Last Rate Last Dose   • metoprolol (LOPRESSOR) tablet 25 mg  25 mg Oral TWICE DAILY Domonique Montoya M.D.   25 mg at 02/19/17 2159   • aspirin EC (ECOTRIN) tablet 81 mg  81 mg Oral QAM Domonique Montoya M.D.   81 mg at 02/19/17 1614   • clopidogrel (PLAVIX) tablet 75 mg  75 mg Oral DAILY Domonique Montoya M.D.   75 mg at 02/19/17 1612   • atorvastatin (LIPITOR) tablet 40 mg  40 mg Oral QHS Domonique Montoya M.D.   40 mg at 02/19/17 2159   • NS infusion 1,701 mL  30 mL/kg Intravenous Once PRN Domonique Montoya M.D.       • docusate sodium (COLACE) capsule 100 mg  100 mg Oral BID Domonique Montoya M.D.   100 mg at 02/19/17 2159   • Respiratory Care per Protocol   Nebulization Continuous RT Domonique Montoya M.D.       • NS (BOLUS) infusion 500 mL  500 mL Intravenous Once PRN Domonique Montoya M.D.       • levothyroxine (SYNTHROID) tablet 100 mcg  100 mcg Oral AM ES Domonique Montoya M.D.   100 mcg at 02/20/17 0640   • venlafaxine XR (EFFEXOR XR) capsule 150 mg  150 mg Oral DAILY Domonique Montoya M.D.   150 mg at 02/19/17 0856       Physical Exam:  Filed Vitals:    02/19/17 1600 02/19/17 2124 02/20/17 0016 " 02/20/17 0443   BP: 125/71 114/89 146/64 145/80   Pulse: 71 75 62 64   Temp: 36.5 °C (97.7 °F) 36.7 °C (98.1 °F) 36.8 °C (98.3 °F) 36.7 °C (98.1 °F)   Resp: 18 18 18 18   Height:       Weight:       SpO2: 95% 96% 91% 96%     General appearance: Asleep, NAD  Eyes: anicteric sclerae, moist conjunctivae; no lid-lag; PERRLA  HENT: Atraumatic; oropharynx clear with moist mucous membranes and no mucosal ulcerations; normal hard and soft palate  Neck: Trachea midline; FROM, supple, no thyromegaly or lymphadenopathy  Lungs: CTA, with normal respiratory effort and no intercostal retractions  CV: Irregular, no MRGs, no JVD  Abdomen: Soft, non-tender; no masses or HSM  Extremities: No peripheral edema or extremity lymphadenopathy  Skin: Normal temperature, turgor and texture; no rash, ulcers or subcutaneous nodules  Psych: Appropriate affect, alert and oriented to person, place and time    Data:  Labs reviewed    Prior echo/stress reviewed:  Echo in 4/2016 showing preserved LV function    EKG interpreted by me:  AF with rates in the low 100s    Impression/Plan:  1)pAF  2)SIRS  3)Subdural hematoma    -Telemetry reviewed, sinus w PACs  -Not candidate for AC  -Continue low dose BB    Jonnathan Nicolas MD

## 2017-02-20 NOTE — DISCHARGE PLANNING
[facility] received a hospice choice form for [facility], referral has been sent to both facility per choice form and floor JOHN Larson.

## 2017-02-20 NOTE — PROGRESS NOTES
Received bedside report from Hayley ESCOBAR. Assumed care of pt who is restless in bed, RR even/unlabored. Fall protocol in place. CLIP. Will continue to monitor.

## 2017-02-20 NOTE — CARE PLAN
Problem: Safety  Goal: Will remain free from falls  Intervention: Implement fall precautions    02/18/17 2000 02/20/17 0855   OTHER   Environmental Precautions --  Treaded Slipper Socks on Patient;Personal Belongings, Wastebasket, Call Bell etc. in Easy Reach;Transferred to Stronger Side;Report Given to Other Health Care Providers Regarding Fall Risk;Bed in Low Position;Mobility Assessed & Appropriate Sign Placed;Communication Sign for Patients & Families   IV Pole on Same Side of Bed as Bathroom --  Yes   Bedrails --  Bedrails Closest to Bathroom Down   Chair/Bed Strip Alarm --  Yes - Alarm On   Bed Alarm (Built in - for ICU ONLY) Yes - Alarm On --      Pt is disoriented, fall protocol in place. Family educated on fall protocol        Problem: Skin Integrity  Goal: Risk for impaired skin integrity will decrease  Intervention: Implement precautions to protect skin integrity in collaboration with the interdisciplinary team    02/20/17 0855   OTHER   Skin Preventative Measures Pillows in Use for Support / Positioning   Bed Types Pressure Redistribution Mattress (Atmosair)   Friction Interventions Draw Sheet / Pad Used for Repositioning   Moisturizers Barrier Paste   PT / OT Involved in Care Order has been Placed   Activity  Bed   Patient Turns / Repositioning Left Side   Assistance / Tolerance for Turning/Repositioning Assistance of Two or More   Patient is Receiving Nutrition Oral Intake Adequate   Vitamin Therapy in Use No

## 2017-02-21 PROBLEM — I48.0 PAROXYSMAL ATRIAL FIBRILLATION (HCC): Status: ACTIVE | Noted: 2017-02-21

## 2017-02-21 PROBLEM — G93.40 ENCEPHALOPATHY: Status: ACTIVE | Noted: 2017-02-21

## 2017-02-21 LAB
ANION GAP SERPL CALC-SCNC: 10 MMOL/L (ref 0–11.9)
BUN SERPL-MCNC: 10 MG/DL (ref 8–22)
CALCIUM SERPL-MCNC: 8.6 MG/DL (ref 8.4–10.2)
CHLORIDE SERPL-SCNC: 104 MMOL/L (ref 96–112)
CO2 SERPL-SCNC: 23 MMOL/L (ref 20–33)
CREAT SERPL-MCNC: 0.73 MG/DL (ref 0.5–1.4)
EKG IMPRESSION: NORMAL
ERYTHROCYTE [DISTWIDTH] IN BLOOD BY AUTOMATED COUNT: 40.6 FL (ref 35.9–50)
GFR SERPL CREATININE-BSD FRML MDRD: >60 ML/MIN/1.73 M 2
GLUCOSE SERPL-MCNC: 109 MG/DL (ref 65–99)
HCT VFR BLD AUTO: 37.8 % (ref 37–47)
HGB BLD-MCNC: 13.2 G/DL (ref 12–16)
MCH RBC QN AUTO: 30.1 PG (ref 27–33)
MCHC RBC AUTO-ENTMCNC: 34.9 G/DL (ref 33.6–35)
MCV RBC AUTO: 86.3 FL (ref 81.4–97.8)
PLATELET # BLD AUTO: 332 K/UL (ref 164–446)
PMV BLD AUTO: 10.5 FL (ref 9–12.9)
POTASSIUM SERPL-SCNC: 3.7 MMOL/L (ref 3.6–5.5)
RBC # BLD AUTO: 4.38 M/UL (ref 4.2–5.4)
SODIUM SERPL-SCNC: 137 MMOL/L (ref 135–145)
WBC # BLD AUTO: 10.6 K/UL (ref 4.8–10.8)

## 2017-02-21 PROCEDURE — 85027 COMPLETE CBC AUTOMATED: CPT

## 2017-02-21 PROCEDURE — 700102 HCHG RX REV CODE 250 W/ 637 OVERRIDE(OP): Performed by: FAMILY MEDICINE

## 2017-02-21 PROCEDURE — A9270 NON-COVERED ITEM OR SERVICE: HCPCS | Performed by: HOSPITALIST

## 2017-02-21 PROCEDURE — A9270 NON-COVERED ITEM OR SERVICE: HCPCS | Performed by: FAMILY MEDICINE

## 2017-02-21 PROCEDURE — 700111 HCHG RX REV CODE 636 W/ 250 OVERRIDE (IP): Performed by: HOSPITALIST

## 2017-02-21 PROCEDURE — 80048 BASIC METABOLIC PNL TOTAL CA: CPT

## 2017-02-21 PROCEDURE — 97530 THERAPEUTIC ACTIVITIES: CPT

## 2017-02-21 PROCEDURE — 700112 HCHG RX REV CODE 229: Performed by: HOSPITALIST

## 2017-02-21 PROCEDURE — 97116 GAIT TRAINING THERAPY: CPT

## 2017-02-21 PROCEDURE — 770020 HCHG ROOM/CARE - TELE (206)

## 2017-02-21 PROCEDURE — 700102 HCHG RX REV CODE 250 W/ 637 OVERRIDE(OP): Performed by: HOSPITALIST

## 2017-02-21 PROCEDURE — 700105 HCHG RX REV CODE 258: Performed by: HOSPITALIST

## 2017-02-21 PROCEDURE — 99233 SBSQ HOSP IP/OBS HIGH 50: CPT | Performed by: FAMILY MEDICINE

## 2017-02-21 PROCEDURE — 700101 HCHG RX REV CODE 250: Performed by: FAMILY MEDICINE

## 2017-02-21 RX ORDER — ACETAMINOPHEN 500 MG
1000 TABLET ORAL 2 TIMES DAILY
Status: DISCONTINUED | OUTPATIENT
Start: 2017-02-21 | End: 2017-02-25 | Stop reason: HOSPADM

## 2017-02-21 RX ORDER — LEVOTHYROXINE SODIUM 0.07 MG/1
75 TABLET ORAL
Status: DISCONTINUED | OUTPATIENT
Start: 2017-02-22 | End: 2017-02-25 | Stop reason: HOSPADM

## 2017-02-21 RX ADMIN — METRONIDAZOLE 500 MG: 500 INJECTION, SOLUTION INTRAVENOUS at 13:38

## 2017-02-21 RX ADMIN — METRONIDAZOLE 500 MG: 500 INJECTION, SOLUTION INTRAVENOUS at 21:04

## 2017-02-21 RX ADMIN — DOCUSATE SODIUM 100 MG: 100 CAPSULE, LIQUID FILLED ORAL at 21:05

## 2017-02-21 RX ADMIN — METRONIDAZOLE 500 MG: 500 INJECTION, SOLUTION INTRAVENOUS at 08:54

## 2017-02-21 RX ADMIN — ACETAMINOPHEN 1000 MG: 500 TABLET, COATED ORAL at 21:05

## 2017-02-21 RX ADMIN — ATORVASTATIN CALCIUM 40 MG: 40 TABLET, FILM COATED ORAL at 21:05

## 2017-02-21 RX ADMIN — CEFTRIAXONE 2 G: 2 INJECTION, POWDER, FOR SOLUTION INTRAMUSCULAR; INTRAVENOUS at 10:15

## 2017-02-21 RX ADMIN — LEVOTHYROXINE SODIUM 100 MCG: 50 TABLET ORAL at 05:58

## 2017-02-21 RX ADMIN — METOPROLOL TARTRATE 25 MG: 25 TABLET ORAL at 21:05

## 2017-02-21 ASSESSMENT — GAIT ASSESSMENTS
ASSISTIVE DEVICE: HAND HELD ASSIST
DEVIATION: BRADYKINETIC;SHUFFLED GAIT;DECREASED BASE OF SUPPORT
GAIT LEVEL OF ASSIST: MODERATE ASSIST
DISTANCE (FEET): 15

## 2017-02-21 ASSESSMENT — PAIN SCALES - GENERAL
PAINLEVEL_OUTOF10: 0

## 2017-02-21 NOTE — CARE PLAN
Problem: Safety  Goal: Will remain free from falls  Intervention: Implement fall precautions    02/18/17 2000 02/21/17 0854   OTHER   Environmental Precautions --  Treaded Slipper Socks on Patient;Personal Belongings, Wastebasket, Call Bell etc. in Easy Reach;Transferred to Stronger Side;Report Given to Other Health Care Providers Regarding Fall Risk;Bed in Low Position;Communication Sign for Patients & Families;Mobility Assessed & Appropriate Sign Placed   IV Pole on Same Side of Bed as Bathroom --  Yes   Bedrails --  Bedrails Closest to Bathroom Down   Chair/Bed Strip Alarm --  Yes - Alarm On   Bed Alarm (Built in - for ICU ONLY) Yes - Alarm On --              Problem: Bowel/Gastric:  Goal: Normal bowel function is maintained or improved    02/21/17 0854   OTHER   Last BM 02/21/17

## 2017-02-21 NOTE — THERAPY
"Occupational Therapy Evaluation completed.   Functional Status:  A&Ox1. Confusion. Encouragement needed for EOB/OOB. Family present. MaxAx2 for EOB. MaxAx2 for transfer to chair. Most self-cares require Mod-MaxA. Feeds with CGA; food pocketing noted.    Plan of Care: Will benefit from Occupational Therapy 2 times per week  Discharge Recommendations:  Equipment: Will Continue to Assess for Equipment Needs. Post-acute therapy recommended before discharged home.    See \"Rehab Therapy-Acute\" Patient Summary Report for complete documentation.    "

## 2017-02-21 NOTE — CARE PLAN
Problem: Communication  Goal: The ability to communicate needs accurately and effectively will improve  Oriented patient to the call light in order to alert staff of her needs. Educated patient about the plan of care, procedures, treatments, medications, and allowing for patient questions and answering them appropriately. Patient requiring frequent reorientation due to dementia    Problem: Safety  Goal: Will remain free from falls  Assessing patient for risk factors for falls and implementing fall precautions as needed. Bed alarm is on, bed controls are on and bed is locked in a low position, treaded slipper socks on patient, CLIP    Problem: Skin Integrity  Goal: Risk for impaired skin integrity will decrease  Assessing patient's skin and implementing precautions to protect skin integrity

## 2017-02-21 NOTE — PROGRESS NOTES
Bedside report received from Aye RN, patient sitting comfortably in bed at this time and denies any needs, safety precautions in place, CLIP

## 2017-02-21 NOTE — PROGRESS NOTES
Assessments performed, patient is A&O x 1 (self) at this time and is aware is it February but stated it was 2016, neuro checks performed (see flowsheets), due medications administered per MAR, patient readjusted in bed and denies any needs at this time, safety precautions in place, CLIP

## 2017-02-21 NOTE — PROGRESS NOTES
Received bedside report from Hayley ESCOBAR. Assumed care of pt who is resting in bed RR even/unlabored. Fall protocol in place. CLIP. Will continue to monitor.

## 2017-02-21 NOTE — PROGRESS NOTES
Tele Note    Rhythm Sinus Rhythm  Rate 60/100  TN 0.16  QRS 0.10  QT 0.40  Ectopy Frequent PVC, PAC, coup.

## 2017-02-21 NOTE — PROGRESS NOTES
Tele strip at 1916 shows sinus tachycardia with a HR of 121.      Measurements: .16/.08/.38    Tele Shift Summary:    Rhythm : Sinus rhythm/arrythmia  Rate : 70s to 90s    Ectopy : Per CCT Mariah, pt had frequent PACs, occasional PVCs and a wandering atrial pace    Telemetry monitoring strips placed in pt chart.

## 2017-02-21 NOTE — PROGRESS NOTES
Hospital Medicine Progress Note, Adult, Complex               Author: Azeem AMADOR Thaniawaantonia Date & Time created: 2017  3:25 PM     Interval History:  Admitted for Encephalopathy, Hypotension, found to have SDH, also with episode of Atrial fibrillation.    Enceph - no improvement noted  Hypotension - BP better  SDH - stable, subacute   Afib - back to SR    Review of Systems:  Review of Systems   Unable to perform ROS: medical condition       Physical Exam:  Physical Exam   Constitutional: She appears well-developed.   HENT:   Head: Normocephalic.   Eyes: Conjunctivae are normal. Pupils are equal, round, and reactive to light.   Neck: No tracheal deviation present. No thyromegaly present.   Cardiovascular: Normal rate and regular rhythm.    Pulmonary/Chest: Effort normal and breath sounds normal.   Abdominal: Soft. Bowel sounds are normal. She exhibits no distension. There is no tenderness.   Lymphadenopathy:     She has no cervical adenopathy.   Neurological: She is alert.   Oriented to person  Conversant but tangential  Follows some commands   Skin: Skin is warm and dry.   Nursing note and vitals reviewed.      Labs:        Invalid input(s): [id number]      Recent Labs      17   0615  17   0621  17   0403   SODIUM   --   136  137   POTASSIUM   --   3.9  3.7   CHLORIDE   --   104  104   CO2   --   23  23   BUN   --   7*  10   CREATININE   --   0.77  0.73   MAGNESIUM  1.6   --    --    CALCIUM   --   8.7  8.6     Recent Labs      17   0621  17   0403   GLUCOSE  103*  109*     Recent Labs      17   0621  17   0500  17   0403   RBC  4.09*  4.26  4.38   HEMOGLOBIN  12.4  12.8  13.2   HEMATOCRIT  35.3*  36.8*  37.8   PLATELETCT  275  306  332     Recent Labs      17   0617   0500  17   0403   WBC  11.6*  12.7*  10.6           Hemodynamics:  Temp (24hrs), Av.8 °C (98.2 °F), Min:36.6 °C (97.9 °F), Max:36.9 °C (98.5 °F)  Temperature: 36.9 °C  (98.5 °F)  Pulse  Av.1  Min: 54  Max: 96   Blood Pressure : 126/71 mmHg     Respiratory:    Respiration: 18, Pulse Oximetry: 98 %        RLL Breath Sounds: Diminished, LLL Breath Sounds: Diminished  Fluids:    Intake/Output Summary (Last 24 hours) at 17 1525  Last data filed at 17 2200   Gross per 24 hour   Intake     50 ml   Output      0 ml   Net     50 ml        GI/Nutrition:  Orders Placed This Encounter   Procedures   • Diet Order     Standing Status: Standing      Number of Occurrences: 1      Standing Expiration Date:      Order Specific Question:  Diet:     Answer:  Regular [1]     Order Specific Question:  Texture/Fiber modifications:     Answer:  Dysphagia 3(Mechanical Soft)specify fluid consistency(question 6) [3]     Order Specific Question:  Consistency/Fluid modifications:     Answer:  Thin Liquids [3]     Order Specific Question:  Miscellaneous modifications:     Answer:  SLP - 1:1 Supervision by Nursing [21]     Medical Decision Making, by Problem:  Active Hospital Problems    Diagnosis   • *Encephalopathy [G93.40]      multifactorial   • SDH (subdural hematoma) (CMS-HCC) [I62.00]   • SIRS (systemic inflammatory response syndrome) (CMS-HCC) [R65.10]  Pneumonia?, IV Rocephin and Flagyl   • Hypotension [I95.9]      resolved   • Paroxysmal atrial fibrillation (CMS-HCC) [I48.0]      Metoprolol, back to SR, poor candidate for anticoagulation        • History of stroke [Z86.73]          stop ASA and Plavix   • Hypothyroid [E03.9]     Low TSH, decrease Synthroid to 75 mcg   • Dyslipidemia [E78.5]      Lipitor       Medications reviewed, Labs reviewed and Radiology images reviewed  Salomon catheter: No Salomon      DVT Prophylaxis: Contraindicated - High bleeding risk  DVT prophylaxis - mechanical: SCDs  Ulcer prophylaxis: No  Antibiotics: Treating active infection/contamination beyond 24 hours perioperative coverage

## 2017-02-21 NOTE — PROGRESS NOTES
Cardiology Progress Note               Author: Jackson Frost Date & Time created: 2017  9:55 AM     Interval History:  Alert and answering questions appropriately  No further atrial fibrillation.  Evaluation and treatment continue  Denies head pain    Review of Systems   Unable to perform ROS: dementia       Physical Exam   Constitutional: She is oriented to person, place, and time. She appears well-developed and well-nourished. No distress.   Frail, elderly   HENT:   Head: Normocephalic and atraumatic.   Right Ear: External ear normal.   Left Ear: External ear normal.   Eyes: Conjunctivae and EOM are normal. Right eye exhibits no discharge. Left eye exhibits no discharge. No scleral icterus.   Neck: Neck supple. No JVD present. No thyromegaly present.   Cardiovascular: Normal rate, regular rhythm and normal heart sounds.  Exam reveals no gallop and no friction rub.    No murmur heard.  Pulmonary/Chest: Effort normal and breath sounds normal. No respiratory distress. She has no wheezes. She has no rales. She exhibits no tenderness.   Abdominal: Soft. Bowel sounds are normal. She exhibits no distension and no mass. There is no tenderness.   Musculoskeletal: Normal range of motion. She exhibits no edema.   Neurological: She is alert and oriented to person, place, and time. No cranial nerve deficit. Coordination normal.   Skin: Skin is warm and dry. No rash noted. She is not diaphoretic. No erythema. No pallor.   Psychiatric: She has a normal mood and affect. Her behavior is normal. Judgment and thought content normal. Cognition and memory are impaired. She exhibits abnormal recent memory.   Nursing note and vitals reviewed.      Hemodynamics:  Temp (24hrs), Av.8 °C (98.2 °F), Min:36.6 °C (97.9 °F), Max:36.9 °C (98.5 °F)  Temperature: 36.9 °C (98.5 °F)  Pulse  Av.1  Min: 54  Max: 96   Blood Pressure : 102/72 mmHg     Respiratory:    Respiration: 18, Pulse Oximetry: 100 %        RLL Breath Sounds:  Diminished, LLL Breath Sounds: Diminished  Fluids:        GI/Nutrition:  Orders Placed This Encounter   Procedures   • Diet Order     Standing Status: Standing      Number of Occurrences: 1      Standing Expiration Date:      Order Specific Question:  Diet:     Answer:  Regular [1]     Order Specific Question:  Texture/Fiber modifications:     Answer:  Dysphagia 3(Mechanical Soft)specify fluid consistency(question 6) [3]     Order Specific Question:  Consistency/Fluid modifications:     Answer:  Thin Liquids [3]     Order Specific Question:  Miscellaneous modifications:     Answer:  SLP - 1:1 Supervision by Nursing [21]     Lab Results:  Recent Labs      02/19/17   0621  02/20/17   0500  02/21/17   0403   WBC  11.6*  12.7*  10.6   RBC  4.09*  4.26  4.38   HEMOGLOBIN  12.4  12.8  13.2   HEMATOCRIT  35.3*  36.8*  37.8   MCV  86.3  86.4  86.3   MCH  30.3  30.0  30.1   MCHC  35.1*  34.8  34.9   RDW  41.0  40.0  40.6   PLATELETCT  275  306  332   MPV  10.2  10.4  10.5     Recent Labs      02/19/17   0621  02/21/17   0403   SODIUM  136  137   POTASSIUM  3.9  3.7   CHLORIDE  104  104   CO2  23  23   GLUCOSE  103*  109*   BUN  7*  10   CREATININE  0.77  0.73   CALCIUM  8.7  8.6                         Medical Decision Making, by Problem:  Active Hospital Problems    Diagnosis   • SDH (subdural hematoma) (CMS-[facility]) [I62.00]   • SIRS (systemic inflammatory response syndrome) (CMS-[facility]) [R65.10]   • Hypotension [I95.9]   • History of hypertension [Z86.79]   • Hypothyroid [E03.9]   • Dyslipidemia [E78.5]       Plan:  No further atrial fibrillation.  Continue observation without antiarrhythmics.  No antithrombotic due to subdural hematoma    EKG reviewed, Medications reviewed, Labs reviewed and Radiology images reviewed

## 2017-02-21 NOTE — PROGRESS NOTES
Pt resting in bed with eyes closed, IV antibiotics finished. Saline locked. Respirations even and unlabored. Bed in low position. CLIP. Bed alarm on.

## 2017-02-21 NOTE — PROGRESS NOTES
Patient resting on and off in bed at this time with no signs of distress noted, bed alarm is on, safety precautions in place, CLIP

## 2017-02-21 NOTE — THERAPY
"Physical Therapy Treatment completed.   Bed Mobility:  Supine to Sit: Moderate Assist  Transfers: Sit to Stand: Moderate Assist  Gait: Level Of Assist: Moderate Assist (x 2 people) with hand held assist       Plan of Care: Will benefit from Physical Therapy 5 times per week  Discharge Recommendations: Equipment: Will Continue to Assess for Equipment Needs. Post-acute therapy recommended before discharged home.     See \"Rehab Therapy-Acute\" Patient Summary Report for complete documentation.       "

## 2017-02-21 NOTE — PROGRESS NOTES
Pt resting in bed with family at bed, pt worked with pt today. Bed in low position. Bed alarm on.

## 2017-02-22 LAB
BACTERIA BLD CULT: NORMAL
BACTERIA BLD CULT: NORMAL
SIGNIFICANT IND 70042: NORMAL
SIGNIFICANT IND 70042: NORMAL
SITE SITE: NORMAL
SITE SITE: NORMAL
SOURCE SOURCE: NORMAL
SOURCE SOURCE: NORMAL

## 2017-02-22 PROCEDURE — 700105 HCHG RX REV CODE 258: Performed by: HOSPITALIST

## 2017-02-22 PROCEDURE — A9270 NON-COVERED ITEM OR SERVICE: HCPCS | Performed by: HOSPITALIST

## 2017-02-22 PROCEDURE — 770020 HCHG ROOM/CARE - TELE (206)

## 2017-02-22 PROCEDURE — 97116 GAIT TRAINING THERAPY: CPT

## 2017-02-22 PROCEDURE — 92507 TX SP LANG VOICE COMM INDIV: CPT

## 2017-02-22 PROCEDURE — 700101 HCHG RX REV CODE 250: Performed by: FAMILY MEDICINE

## 2017-02-22 PROCEDURE — 700112 HCHG RX REV CODE 229: Performed by: HOSPITALIST

## 2017-02-22 PROCEDURE — 700102 HCHG RX REV CODE 250 W/ 637 OVERRIDE(OP): Performed by: HOSPITALIST

## 2017-02-22 PROCEDURE — 700102 HCHG RX REV CODE 250 W/ 637 OVERRIDE(OP): Performed by: FAMILY MEDICINE

## 2017-02-22 PROCEDURE — A9270 NON-COVERED ITEM OR SERVICE: HCPCS | Performed by: FAMILY MEDICINE

## 2017-02-22 PROCEDURE — 700111 HCHG RX REV CODE 636 W/ 250 OVERRIDE (IP): Performed by: HOSPITALIST

## 2017-02-22 PROCEDURE — 99232 SBSQ HOSP IP/OBS MODERATE 35: CPT | Performed by: FAMILY MEDICINE

## 2017-02-22 RX ADMIN — CEFTRIAXONE 2 G: 2 INJECTION, POWDER, FOR SOLUTION INTRAMUSCULAR; INTRAVENOUS at 10:03

## 2017-02-22 RX ADMIN — METOPROLOL TARTRATE 25 MG: 25 TABLET ORAL at 21:06

## 2017-02-22 RX ADMIN — ACETAMINOPHEN 1000 MG: 500 TABLET, COATED ORAL at 08:31

## 2017-02-22 RX ADMIN — METRONIDAZOLE 500 MG: 500 INJECTION, SOLUTION INTRAVENOUS at 21:06

## 2017-02-22 RX ADMIN — ATORVASTATIN CALCIUM 40 MG: 40 TABLET, FILM COATED ORAL at 21:06

## 2017-02-22 RX ADMIN — METRONIDAZOLE 500 MG: 500 INJECTION, SOLUTION INTRAVENOUS at 06:19

## 2017-02-22 RX ADMIN — METRONIDAZOLE 500 MG: 500 INJECTION, SOLUTION INTRAVENOUS at 15:49

## 2017-02-22 RX ADMIN — LEVOTHYROXINE SODIUM 75 MCG: 75 TABLET ORAL at 06:19

## 2017-02-22 RX ADMIN — METOPROLOL TARTRATE 25 MG: 25 TABLET ORAL at 08:31

## 2017-02-22 RX ADMIN — DOCUSATE SODIUM 100 MG: 100 CAPSULE, LIQUID FILLED ORAL at 08:31

## 2017-02-22 RX ADMIN — VENLAFAXINE HYDROCHLORIDE 150 MG: 75 CAPSULE, EXTENDED RELEASE ORAL at 08:31

## 2017-02-22 RX ADMIN — DOCUSATE SODIUM 100 MG: 100 CAPSULE, LIQUID FILLED ORAL at 21:06

## 2017-02-22 RX ADMIN — ACETAMINOPHEN 1000 MG: 500 TABLET, COATED ORAL at 21:06

## 2017-02-22 ASSESSMENT — PAIN SCALES - GENERAL
PAINLEVEL_OUTOF10: 0
PAINLEVEL_OUTOF10: 0

## 2017-02-22 ASSESSMENT — GAIT ASSESSMENTS
GAIT LEVEL OF ASSIST: MODERATE ASSIST
DEVIATION: DECREASED BASE OF SUPPORT;SHUFFLED GAIT;BRADYKINETIC
DISTANCE (FEET): 15
ASSISTIVE DEVICE: HAND HELD ASSIST

## 2017-02-22 ASSESSMENT — COPD QUESTIONNAIRES: DURING THE PAST 4 WEEKS HOW MUCH DID YOU FEEL SHORT OF BREATH: SOME OF THE TIME

## 2017-02-22 ASSESSMENT — PAIN SCALES - WONG BAKER: WONGBAKER_NUMERICALRESPONSE: HURTS JUST A LITTLE BIT

## 2017-02-22 NOTE — THERAPY
"Speech Language Therapy cognitive-linguistic treatment completed.   Functional Status:  Patient more alert today, sitting in reclined chair. Cognitive-linguistic evaluation completed today. Patient presents with mild-moderate cognitive-linguistic deficits. Specifically, she presents with impaired attention, short term memory, and problem solving. Patient needed repetition and verbal cues to open eyes every and attend to task every 3 or 4 minutes. Patient was oriented x 3 today. She presents with normal motor speech, verbal expression, auditory comprehension, reading comprehension, written expression, immediate memory, and safety awareness. Patient may be at her baseline cognition. Speech therapy will f/u with family regarding baseline.  Recommendations:  Recommend continue dysphagia therapy.  Plan of Care: Will benefit from Speech Therapy 3 times per week    See \"Rehab Therapy-Acute\" Patient Summary Report for complete documentation.     "

## 2017-02-22 NOTE — PROGRESS NOTES
Report given to Day Shift RN Bg, patient laying comfortably in bed and has no complaints at this time, safety precautions in place, CLIP

## 2017-02-22 NOTE — PROGRESS NOTES
"Interval History:  No more AFib; Currently in [facility] discussion noted    Physical Exam   Blood pressure 132/59, pulse 89, temperature 37.1 °C (98.8 °F), resp. rate 18, height 1.626 m (5' 4\"), weight 60.3 kg (132 lb 15 oz), SpO2 96 %.    Constitutional:  SHe appears well-developed.   HENT: Normocephalic and atraumatic. No scleral icterus.   Neck: No JVD present.   Cardiovascular: Normal rate. RRR; Exam reveals no gallop and no friction rub. No murmur heard.   Pulmonary/Chest: CTAB coarse   Abdominal: S/NT/ND BS+   Musculoskeletal: SHe exhibits no edema. Pulses present.  Skin: Skin is warm and dry.       Intake/Output Summary (Last 24 hours) at 02/22/17 1318  Last data filed at 02/22/17 1000   Gross per 24 hour   Intake   1470 ml   Output      0 ml   Net   1470 ml       LABS:  Lab Results   Component Value Date/Time    CHOLESTEROL,* 04/10/2016 04:06 AM    * 04/10/2016 04:06 AM    HDL 72 04/10/2016 04:06 AM    TRIGLYCERIDES 106 04/10/2016 04:06 AM       Lab Results   Component Value Date/Time    WBC 10.6 02/21/2017 04:03 AM    RBC 4.38 02/21/2017 04:03 AM    HEMOGLOBIN 13.2 02/21/2017 04:03 AM    HEMATOCRIT 37.8 02/21/2017 04:03 AM    MCV 86.3 02/21/2017 04:03 AM    NEUTROPHILS-POLYS 72.70* 02/17/2017 01:55 PM    LYMPHOCYTES 15.30* 02/17/2017 01:55 PM    MONOCYTES 11.00 02/17/2017 01:55 PM    EOSINOPHILS 0.20 02/17/2017 01:55 PM    BASOPHILS 0.50 02/17/2017 01:55 PM     Lab Results   Component Value Date/Time    SODIUM 137 02/21/2017 04:03 AM    POTASSIUM 3.7 02/21/2017 04:03 AM    CHLORIDE 104 02/21/2017 04:03 AM    CO2 23 02/21/2017 04:03 AM    GLUCOSE 109* 02/21/2017 04:03 AM    BUN 10 02/21/2017 04:03 AM    CREATININE 0.73 02/21/2017 04:03 AM    BUN-CREATININE RATIO 26 07/08/2011 10:00 AM         Lab Results   Component Value Date/Time    ALKALINE PHOSPHATASE 83 02/17/2017 01:55 PM    AST(SGOT) 16 02/17/2017 01:55 PM    ALT(SGPT) 10 02/17/2017 01:55 PM    TOTAL BILIRUBIN 0.8 02/17/2017 " 01:55 PM      Lab Results   Component Value Date/Time    B NATRIURETIC PEPTIDE 30 04/09/2016 01:44 PM      Lab Results   Component Value Date/Time    TSH 0.217* 07/08/2011 10:00 AM     Lab Results   Component Value Date/Time    PT 13.2 04/09/2016 01:44 PM    INR 0.98 04/09/2016 01:44 PM        Medications reviewed    Imaging reviewed    ECHO(2/19/2017):Technically difficult study adequate information is obtained.   Left ventricular ejection fraction is visually estimated to be 70%.  No evidence of valvular abnormality based on blind Doppler evaluation.   Unable to estimate pulmonary artery pressure due to an inadequate   tricuspid regurgitant jet.  No prior study is available for comparison    Impressions:  AFib now in SR  SDH--nor more HA  HTN  HLD  SIRS    Recommendations:  Monitor and keep lyts balanced  No anticoagulation d/t SDH  Will follow  Thx

## 2017-02-22 NOTE — PROGRESS NOTES
Assessment performed (see doc flowsheets), patient is A&O x 2 (self and time), patient is sitting comfortably in bed at this time and denies any needs, safety precautions in place, CLIP

## 2017-02-22 NOTE — PROGRESS NOTES
2/22/17, 0900- Patient fed breakfast by nursing staff. Patient resting to bedside chair. No distress noted at this time. Call bell at side and patient advised to call for needs as they arise.     2/22/17, 1010- Patient assisted back to bed.

## 2017-02-22 NOTE — CARE PLAN
Problem: Communication  Goal: The ability to communicate needs accurately and effectively will improve  Oriented patient to the call light in order to alert staff of her needs. Educated patient about the plan of care, procedures, treatments, medications, and allowing for patient questions and answering them appropriately. Patient requiring frequent reorientation due to dementia    Problem: Safety  Goal: Will remain free from falls  Assessing patient for risk factors for falls and implementing fall precautions as needed. Bed alarm is on, bed controls are on and bed is locked in a low position, treaded slipper socks on patient, CLIP

## 2017-02-22 NOTE — PROGRESS NOTES
Hospital Medicine Progress Note, Adult, Complex               Author: Azeem Monroe Date & Time created: 2017  3:15 PM     Interval History:  Admitted for Encephalopathy, Hypotension, found to have SDH, also with episode of Atrial fibrillation.    Enceph - more alert today  Hypotension - BP better  SDH - stable, subacute   Afib - back to SR    Review of Systems:  Review of Systems   Unable to perform ROS: medical condition       Physical Exam:  Physical Exam   Constitutional: She appears well-developed.   HENT:   Head: Normocephalic.   Eyes: Conjunctivae are normal. Pupils are equal, round, and reactive to light.   Neck: No tracheal deviation present. No thyromegaly present.   Cardiovascular: Normal rate and regular rhythm.    Pulmonary/Chest: Effort normal and breath sounds normal.   Abdominal: Soft. Bowel sounds are normal. She exhibits no distension. There is no tenderness.   Lymphadenopathy:     She has no cervical adenopathy.   Neurological: She is alert.   Oriented to person  Conversant but tangential  Follows some commands   Skin: Skin is warm and dry.   Nursing note and vitals reviewed.      Labs:        Invalid input(s): [id number]      Recent Labs      17   0403   SODIUM  137   POTASSIUM  3.7   CHLORIDE  104   CO2  23   BUN  10   CREATININE  0.73   CALCIUM  8.6     Recent Labs      17   0403   GLUCOSE  109*     Recent Labs      17   0500  17   0403   RBC  4.26  4.38   HEMOGLOBIN  12.8  13.2   HEMATOCRIT  36.8*  37.8   PLATELETCT  306  332     Recent Labs      17   0500  17   0403   WBC  12.7*  10.6           Hemodynamics:  Temp (24hrs), Av.1 °C (98.8 °F), Min:37.1 °C (98.8 °F), Max:37.1 °C (98.8 °F)  Temperature: 37.1 °C (98.8 °F)  Pulse  Av.3  Min: 52  Max: 96   Blood Pressure : 132/59 mmHg     Respiratory:    Respiration: 18, Pulse Oximetry: 96 %        RLL Breath Sounds: Diminished, LLL Breath Sounds: Diminished  Fluids:    Intake/Output Summary  (Last 24 hours) at 02/22/17 1515  Last data filed at 02/22/17 1000   Gross per 24 hour   Intake   1470 ml   Output      0 ml   Net   1470 ml        GI/Nutrition:  Orders Placed This Encounter   Procedures   • Diet Order     Standing Status: Standing      Number of Occurrences: 1      Standing Expiration Date:      Order Specific Question:  Diet:     Answer:  Regular [1]     Order Specific Question:  Texture/Fiber modifications:     Answer:  Dysphagia 3(Mechanical Soft)specify fluid consistency(question 6) [3]     Order Specific Question:  Consistency/Fluid modifications:     Answer:  Thin Liquids [3]     Order Specific Question:  Miscellaneous modifications:     Answer:  SLP - 1:1 Supervision by Nursing [21]     Medical Decision Making, by Problem:  Active Hospital Problems    Diagnosis   • *Encephalopathy [G93.40]      multifactorial   • SDH (subdural hematoma) (CMS-HCC) [I62.00]   • SIRS (systemic inflammatory response syndrome) (CMS-HCC) [R65.10]  Pneumonia?, IV Rocephin and Flagyl   • Hypotension [I95.9]      resolved   • Paroxysmal atrial fibrillation (CMS-HCC) [I48.0]      Metoprolol, back to SR, poor candidate for anticoagulation        • History of stroke [Z86.73]          holding ASA and Plavix   • Hypothyroid [E03.9]     Low TSH, decreased Synthroid to 75 mcg   • Dyslipidemia [E78.5]      Lipitor       Medications reviewed, Labs reviewed and Radiology images reviewed  Salomon catheter: No Salomon      DVT Prophylaxis: Contraindicated - High bleeding risk  DVT prophylaxis - mechanical: SCDs  Ulcer prophylaxis: No  Antibiotics: Treating active infection/contamination beyond 24 hours perioperative coverage

## 2017-02-22 NOTE — DISCHARGE PLANNING
JOHN Canada spoke with pt's daughter, Lorenzo who stated that the family met with [facility] yesterday and will meet with [facility] tomorrow at 1300.  Lorenzo will contact JOHN no later than Friday with their decision and they are waiting on [facility] to let them know if pt can come back there on Hospice.

## 2017-02-22 NOTE — PROGRESS NOTES
Pt clean and dry, denies needs at this time. Bed alarm in low position. Fall protocol in place and bed alarm on.

## 2017-02-22 NOTE — PROGRESS NOTES
2/22/17, 1430- Patient resting back to bed after walking with physical therapy and staff to restroom.

## 2017-02-22 NOTE — PROGRESS NOTES
Patient resting on and off in bed at this time, neuro checks performed and remains the same, patient provided with water and denies any needs at this time, patient repositioned in bed, safety precautions in place, CLIP

## 2017-02-22 NOTE — PROGRESS NOTES
Tele Note    Rhythm Sinus Rhythm  Rate 68-98  MA 0.16  QRS 0.08  QT 0.42  Ectopy frequent PAC, occasional PVC, Rare coup

## 2017-02-22 NOTE — CARE PLAN
Problem: Bowel/Gastric:  Goal: Normal bowel function is maintained or improved  Bowel protocol in place    Problem: Mobility  Goal: Risk for activity intolerance will decrease  Patient moving with staff and physical/occupational therapy.

## 2017-02-22 NOTE — PROGRESS NOTES
Patient resting on and off in bed at this time, no s/s of distress noted, safety precautions in place, CLIP    Tele strip at 1904 shows Sinus rhythm with a HR of 81.      Measurements: .14/.08/.36    Tele Shift Summary:    Rhythm : Sinus rhythm with a WAP  Rate : 60s to 80s    Ectopy : Per CCT Tyson, pt had frequent PVCs and PACs    Telemetry monitoring strips placed in pt chart.

## 2017-02-22 NOTE — PROGRESS NOTES
Bedside report received from Aye RN, patient laying comfortably in bed at this time and denies any needs, safety precautions in place, CLIP

## 2017-02-23 PROCEDURE — A9270 NON-COVERED ITEM OR SERVICE: HCPCS | Performed by: FAMILY MEDICINE

## 2017-02-23 PROCEDURE — 700105 HCHG RX REV CODE 258: Performed by: HOSPITALIST

## 2017-02-23 PROCEDURE — A9270 NON-COVERED ITEM OR SERVICE: HCPCS | Performed by: HOSPITALIST

## 2017-02-23 PROCEDURE — 700102 HCHG RX REV CODE 250 W/ 637 OVERRIDE(OP): Performed by: HOSPITALIST

## 2017-02-23 PROCEDURE — 700101 HCHG RX REV CODE 250: Performed by: FAMILY MEDICINE

## 2017-02-23 PROCEDURE — 700112 HCHG RX REV CODE 229: Performed by: HOSPITALIST

## 2017-02-23 PROCEDURE — 770020 HCHG ROOM/CARE - TELE (206)

## 2017-02-23 PROCEDURE — 700111 HCHG RX REV CODE 636 W/ 250 OVERRIDE (IP): Performed by: HOSPITALIST

## 2017-02-23 PROCEDURE — 99232 SBSQ HOSP IP/OBS MODERATE 35: CPT | Performed by: FAMILY MEDICINE

## 2017-02-23 PROCEDURE — 700102 HCHG RX REV CODE 250 W/ 637 OVERRIDE(OP): Performed by: FAMILY MEDICINE

## 2017-02-23 RX ADMIN — METRONIDAZOLE 500 MG: 500 INJECTION, SOLUTION INTRAVENOUS at 21:10

## 2017-02-23 RX ADMIN — METOPROLOL TARTRATE 25 MG: 25 TABLET ORAL at 21:08

## 2017-02-23 RX ADMIN — ACETAMINOPHEN 1000 MG: 500 TABLET, COATED ORAL at 21:08

## 2017-02-23 RX ADMIN — CEFTRIAXONE 2 G: 2 INJECTION, POWDER, FOR SOLUTION INTRAMUSCULAR; INTRAVENOUS at 09:05

## 2017-02-23 RX ADMIN — DOCUSATE SODIUM 100 MG: 100 CAPSULE, LIQUID FILLED ORAL at 21:08

## 2017-02-23 RX ADMIN — METRONIDAZOLE 500 MG: 500 INJECTION, SOLUTION INTRAVENOUS at 14:36

## 2017-02-23 RX ADMIN — LEVOTHYROXINE SODIUM 75 MCG: 75 TABLET ORAL at 06:19

## 2017-02-23 RX ADMIN — METRONIDAZOLE 500 MG: 500 INJECTION, SOLUTION INTRAVENOUS at 06:19

## 2017-02-23 RX ADMIN — ATORVASTATIN CALCIUM 40 MG: 40 TABLET, FILM COATED ORAL at 21:07

## 2017-02-23 ASSESSMENT — PAIN SCALES - GENERAL
PAINLEVEL_OUTOF10: 0
PAINLEVEL_OUTOF10: 0

## 2017-02-23 NOTE — PROGRESS NOTES
Pt restless in bed; she remains clean & dry. Pt repositioned & boosted up in bed. Warm blankets provided. Bed alarm in place. Hourly rounds continue.

## 2017-02-23 NOTE — PROGRESS NOTES
Telemetry Report: pt has been SR/SB.  HR: 50s-80s  Measurements: (16/08/38)  Ectopy: f PAC, rare PVC    Measurements and updates per Criss NAIR. This RN is responsible solely for telemetry strip interpretation and primary RN is responsible for acute changes in telemetry monitoring.

## 2017-02-23 NOTE — THERAPY
PT session attempted. Patient initially agreed to PT but then refused when asked to try to move or sit up to EOB. Hospice Meeting planned today w/ possible DC back to [place] with hospice cares. PT will cont to monitor.

## 2017-02-23 NOTE — CARE PLAN
Problem: Safety  Goal: Will remain free from injury  Outcome: PROGRESSING AS EXPECTED  Intervention: Provide assistance with mobility  Generalized weakness,confusion, fall risk, safety precaution in placed, bed alarm on at all times, call light within reach.      Problem: Knowledge Deficit  Goal: Knowledge of disease process/condition, treatment plan, diagnostic tests, and medications will improve  Outcome: PROGRESSING AS EXPECTED  Intervention: Explain information regarding disease process/condition, treatment plan, diagnostic tests, and medications and document in education  Gamily updated with plan of care, encourages to verbalizes concern.

## 2017-02-23 NOTE — PROGRESS NOTES
Hospital Medicine Progress Note, Adult, Complex               Author: Azeem Monroe Date & Time created: 2017  2:52 PM     Interval History:  Admitted for Encephalopathy, Hypotension, found to have SDH, also with episode of Atrial fibrillation.    Enceph - more alert   Hypotension - BP better  SDH - stable, subacute   Afib - back to SR  Family thinking of Hospice    Review of Systems:  Review of Systems   Unable to perform ROS: medical condition       Physical Exam:  Physical Exam   Constitutional: She appears well-developed.   HENT:   Head: Normocephalic.   Eyes: Conjunctivae are normal. Pupils are equal, round, and reactive to light.   Neck: No tracheal deviation present. No thyromegaly present.   Cardiovascular: Normal rate and regular rhythm.    Pulmonary/Chest: Effort normal and breath sounds normal.   Abdominal: Soft. Bowel sounds are normal. She exhibits no distension. There is no tenderness.   Lymphadenopathy:     She has no cervical adenopathy.   Neurological: She is alert.   Oriented to person  Conversant but tangential  Follows some commands   Skin: Skin is warm and dry.   Nursing note and vitals reviewed.      Labs:        Invalid input(s): [id number]      Recent Labs      17   0403   SODIUM  137   POTASSIUM  3.7   CHLORIDE  104   CO2  23   BUN  10   CREATININE  0.73   CALCIUM  8.6     Recent Labs      17   0403   GLUCOSE  109*     Recent Labs      17   0403   RBC  4.38   HEMOGLOBIN  13.2   HEMATOCRIT  37.8   PLATELETCT  332     Recent Labs      17   0403   WBC  10.6           Hemodynamics:  Temp (24hrs), Av.4 °C (97.5 °F), Min:36.4 °C (97.5 °F), Max:36.4 °C (97.5 °F)  Temperature: 36.4 °C (97.5 °F)  Pulse  Av  Min: 52  Max: 96   Blood Pressure : 131/66 mmHg     Respiratory:    Respiration: 18, Pulse Oximetry: 94 %        RLL Breath Sounds: Diminished, LLL Breath Sounds: Diminished  Fluids:    Intake/Output Summary (Last 24 hours) at 17 1452  Last data  filed at 02/22/17 1700   Gross per 24 hour   Intake    900 ml   Output      0 ml   Net    900 ml        GI/Nutrition:  Orders Placed This Encounter   Procedures   • Diet Order     Standing Status: Standing      Number of Occurrences: 1      Standing Expiration Date:      Order Specific Question:  Diet:     Answer:  Regular [1]     Order Specific Question:  Texture/Fiber modifications:     Answer:  Dysphagia 3(Mechanical Soft)specify fluid consistency(question 6) [3]     Order Specific Question:  Consistency/Fluid modifications:     Answer:  Thin Liquids [3]     Order Specific Question:  Miscellaneous modifications:     Answer:  SLP - 1:1 Supervision by Nursing [21]     Medical Decision Making, by Problem:  Active Hospital Problems    Diagnosis   • *Encephalopathy [G93.40]      multifactorial   • SDH (subdural hematoma) (CMS-HCC) [I62.00]   • SIRS (systemic inflammatory response syndrome) (CMS-HCC) [R65.10]  Pneumonia?, IV Rocephin and Flagyl   • Hypotension [I95.9]      resolved   • Paroxysmal atrial fibrillation (CMS-HCC) [I48.0]      Metoprolol, back to SR, poor candidate for anticoagulation        • History of stroke [Z86.73]          holding ASA and Plavix   • Hypothyroid [E03.9]     Low TSH, decreased Synthroid to 75 mcg   • Dyslipidemia [E78.5]      Lipitor        SW making arrangements.     Medications reviewed, Labs reviewed and Radiology images reviewed  Salomon catheter: No Salomon      DVT Prophylaxis: Contraindicated - High bleeding risk  DVT prophylaxis - mechanical: SCDs  Ulcer prophylaxis: No  Antibiotics: Treating active infection/contamination beyond 24 hours perioperative coverage

## 2017-02-23 NOTE — PROGRESS NOTES
Alert and oriented 1-2, refusing to take oral medication  This am, IVABT given, assisted 2 person to bathroom, family updated with plan of care, family conference done, will continue to monitor.

## 2017-02-23 NOTE — DISCHARGE PLANNING
Medical Social Work    Referral: Pt discussed at IDT rounds.    Intervention: Family met with [facility] yesterday and will meet with [facility] today at 1300.  Family will follow up with JOHN.  [facility] is not returning anyone's phone call about whether pt can return there with Hospice.     Plan:  JOHN Canada called [facility] [phone] and left a message for Tammi to return  call about pt returning with Hospice.

## 2017-02-23 NOTE — DISCHARGE PLANNING
Medical Social Work    Referral: Pt discussed at IDT rounds this AM.    Intervention: Per flowsheet, pt lives at [facility] and expects to d.c back there with Hospice.  SW called [facility] and no one has called SW back.  Family to meet with [facility] at 1500 today.  Family will follow up with SW tomorrow.      Plan: SW available for any assistance with d.c planning.

## 2017-02-23 NOTE — THERAPY
SPEECH THERAPY NOTE  Unable to complete dysphagia therapy today as patient and family are in a meeting with hospice RN. Pt. is currently on a Dysphagia 3/thin liquid diet. RN reports she is refusing meals and medications today. SLP will continue to follow patient. Thank you.

## 2017-02-23 NOTE — CARE PLAN
Problem: Urinary Elimination:  Goal: Ability to reestablish a normal urinary elimination pattern will improve  Pt incontinent of urine, cleaned up PRN    Problem: Mobility  Goal: Risk for activity intolerance will decrease  Physical therapy involved

## 2017-02-23 NOTE — THERAPY
"Physical Therapy Treatment completed.   Bed Mobility:  Supine to Sit: Moderate Assist  Transfers: Sit to Stand: Moderate Assist  Gait: Level Of Assist: Moderate Assist (x2 people) with hand held assist to/from bathroom  Plan of Care: Will benefit from Physical Therapy 5 times per week  Discharge Recommendations: Equipment: Will Continue to Assess for Equipment Needs. Post-acute therapy recommended before discharged home.     See \"Rehab Therapy-Acute\" Patient Summary Report for complete documentation.       "

## 2017-02-24 PROCEDURE — 700102 HCHG RX REV CODE 250 W/ 637 OVERRIDE(OP): Performed by: FAMILY MEDICINE

## 2017-02-24 PROCEDURE — 700112 HCHG RX REV CODE 229: Performed by: HOSPITALIST

## 2017-02-24 PROCEDURE — 700105 HCHG RX REV CODE 258: Performed by: HOSPITALIST

## 2017-02-24 PROCEDURE — 700101 HCHG RX REV CODE 250: Performed by: FAMILY MEDICINE

## 2017-02-24 PROCEDURE — A9270 NON-COVERED ITEM OR SERVICE: HCPCS | Performed by: HOSPITALIST

## 2017-02-24 PROCEDURE — A9270 NON-COVERED ITEM OR SERVICE: HCPCS | Performed by: FAMILY MEDICINE

## 2017-02-24 PROCEDURE — 99232 SBSQ HOSP IP/OBS MODERATE 35: CPT | Performed by: FAMILY MEDICINE

## 2017-02-24 PROCEDURE — 700102 HCHG RX REV CODE 250 W/ 637 OVERRIDE(OP): Performed by: HOSPITALIST

## 2017-02-24 PROCEDURE — 770020 HCHG ROOM/CARE - TELE (206)

## 2017-02-24 PROCEDURE — 700111 HCHG RX REV CODE 636 W/ 250 OVERRIDE (IP): Performed by: HOSPITALIST

## 2017-02-24 PROCEDURE — G9167 ATTEN D/C STATUS: HCPCS | Mod: CK

## 2017-02-24 PROCEDURE — G9166 ATTEN GOAL STATUS: HCPCS | Mod: CI

## 2017-02-24 RX ADMIN — DOCUSATE SODIUM 100 MG: 100 CAPSULE, LIQUID FILLED ORAL at 21:04

## 2017-02-24 RX ADMIN — ACETAMINOPHEN 1000 MG: 500 TABLET, COATED ORAL at 21:04

## 2017-02-24 RX ADMIN — CEFTRIAXONE 2 G: 2 INJECTION, POWDER, FOR SOLUTION INTRAMUSCULAR; INTRAVENOUS at 09:32

## 2017-02-24 RX ADMIN — ATORVASTATIN CALCIUM 40 MG: 40 TABLET, FILM COATED ORAL at 21:04

## 2017-02-24 RX ADMIN — LEVOTHYROXINE SODIUM 75 MCG: 75 TABLET ORAL at 06:00

## 2017-02-24 RX ADMIN — METRONIDAZOLE 500 MG: 500 INJECTION, SOLUTION INTRAVENOUS at 05:43

## 2017-02-24 RX ADMIN — METRONIDAZOLE 500 MG: 500 INJECTION, SOLUTION INTRAVENOUS at 15:31

## 2017-02-24 RX ADMIN — METOPROLOL TARTRATE 25 MG: 25 TABLET ORAL at 21:04

## 2017-02-24 RX ADMIN — METRONIDAZOLE 500 MG: 500 INJECTION, SOLUTION INTRAVENOUS at 21:08

## 2017-02-24 ASSESSMENT — PAIN SCALES - GENERAL
PAINLEVEL_OUTOF10: 0
PAINLEVEL_OUTOF10: 0

## 2017-02-24 NOTE — PROGRESS NOTES
Assessment completed, alert and oriented  to person n On room air sat 94%. Denied pain at this time.Due medication given per MAR.

## 2017-02-24 NOTE — PROGRESS NOTES
Hospital Medicine Progress Note, Adult, Complex               Author: Azeem Monteirowaantonia Date & Time created: 2017  2:28 PM     Interval History:  Admitted for Encephalopathy, Hypotension, found to have SDH, also with episode of Atrial fibrillation.    Enceph - more alert   Hypotension - BP better  SDH - stable, subacute   Afib - back to SR  Family has decided on Hospice    Review of Systems:  Review of Systems   Unable to perform ROS: medical condition       Physical Exam:  Physical Exam   Constitutional: She appears well-developed.   HENT:   Head: Normocephalic.   Eyes: Conjunctivae are normal. Pupils are equal, round, and reactive to light.   Neck: No tracheal deviation present. No thyromegaly present.   Cardiovascular: Normal rate and regular rhythm.    Pulmonary/Chest: Effort normal and breath sounds normal.   Abdominal: Soft. Bowel sounds are normal. She exhibits no distension. There is no tenderness.   Lymphadenopathy:     She has no cervical adenopathy.   Neurological: She is alert.   Oriented to person  Conversant but tangential  Follows some commands   Skin: Skin is warm and dry.   Nursing note and vitals reviewed.      Labs:        Invalid input(s): [id number]      No results for input(s): SODIUM, POTASSIUM, CHLORIDE, CO2, BUN, CREATININE, MAGNESIUM, PHOSPHORUS, CALCIUM in the last 72 hours.  No results for input(s): ALTSGPT, ASTSGOT, ALKPHOSPHAT, TBILIRUBIN, DBILIRUBIN, GAMMAGT, AMYLASE, LIPASE, ALB, PREALBUMIN, GLUCOSE in the last 72 hours.  No results for input(s): RBC, HEMOGLOBIN, HEMATOCRIT, PLATELETCT, PROTHROMBTM, APTT, INR, IRON, FERRITIN, TOTIRONBC in the last 72 hours.            Hemodynamics:  Temp (24hrs), Av.4 °C (97.5 °F), Min:36.2 °C (97.1 °F), Max:36.6 °C (97.9 °F)  Temperature: 36.4 °C (97.5 °F)  Pulse  Av.8  Min: 52  Max: 96   Blood Pressure : 114/63 mmHg     Respiratory:    Respiration: 16, Pulse Oximetry: 97 %        RLL Breath Sounds: Diminished, LLL Breath Sounds:  Diminished  Fluids:    Intake/Output Summary (Last 24 hours) at 02/24/17 1428  Last data filed at 02/24/17 1300   Gross per 24 hour   Intake    700 ml   Output      0 ml   Net    700 ml        GI/Nutrition:  Orders Placed This Encounter   Procedures   • Diet Order     Standing Status: Standing      Number of Occurrences: 1      Standing Expiration Date:      Order Specific Question:  Diet:     Answer:  Regular [1]     Order Specific Question:  Texture/Fiber modifications:     Answer:  Dysphagia 3(Mechanical Soft)specify fluid consistency(question 6) [3]     Order Specific Question:  Consistency/Fluid modifications:     Answer:  Thin Liquids [3]     Order Specific Question:  Miscellaneous modifications:     Answer:  SLP - 1:1 Supervision by Nursing [21]     Medical Decision Making, by Problem:  Active Hospital Problems    Diagnosis   • *Encephalopathy [G93.40]      multifactorial   • SDH (subdural hematoma) (CMS-HCC) [I62.00]   • SIRS (systemic inflammatory response syndrome) (CMS-HCC) [R65.10]  Pneumonia?, IV Rocephin and Flagyl   • Hypotension [I95.9]      resolved   • Paroxysmal atrial fibrillation (CMS-HCC) [I48.0]      Metoprolol, back to SR, poor candidate for anticoagulation        • History of stroke [Z86.73]          holding ASA and Plavix   • Hypothyroid [E03.9]     Low TSH, decreased Synthroid to 75 mcg   • Dyslipidemia [E78.5]      Lipitor        Hospice arrangements    Medications reviewed, Labs reviewed and Radiology images reviewed  Salomon catheter: No Salomon      DVT Prophylaxis: Contraindicated - High bleeding risk  DVT prophylaxis - mechanical: SCDs  Ulcer prophylaxis: No  Antibiotics: Treating active infection/contamination beyond 24 hours perioperative coverage

## 2017-02-24 NOTE — DISCHARGE PLANNING
JOHN Canada received a call from pt's daughter, Lorenzo who stated they are going with [facility].  Lorenzo requested a call from [employer] on her cell [phone].  Daughter is trying to get pt's bed removed in time for hospital bed.  JOHN called [employer] and left voicemail as well as texted her requesting she call Lorenzo on her cell phone.

## 2017-02-24 NOTE — THERAPY
SPEECH THERAPY NOTE  Patient and family have decided to go with \[facility]\"" Hospice care. Recommend discontinue Speech Therapy services at this time. Thank you.

## 2017-02-24 NOTE — CARE PLAN
Problem: Safety  Goal: Will remain free from injury  Outcome: PROGRESSING AS EXPECTED  Hourly rounding.  Non-skid socks. Bed locked & in low position. Personal belongings within reach. .        Problem: Mobility  Goal: Risk for activity intolerance will decrease  Outcome: PROGRESSING AS EXPECTED  Patient was assisted in turning, patient kept clean and joe

## 2017-02-24 NOTE — PROGRESS NOTES
Bedside report given to  Vannessa ESCOBAR.Pt  Resting in the bed.Safety precautions in place and all the lines remain patent.

## 2017-02-24 NOTE — DISCHARGE PLANNING
JOHN Canada called pt's daughter, Lorenzo at [phone] who stated that the best number to reach her is [phone] and [facility] will take pt back.  JOHN Canada requested a call back after lunch to see which Hospice they will chose.  They are leaning more toward [facility] but family is dealing with an emergency with the family pet so they will be able to discuss and let JOHN know ASAP today.  JOHN will then contact the Hospice company, see when equipment will arrive at [facility] and then arrange [facility] transport.  Probably tomorrow depending on time of Hospice choice and equipment delivery.

## 2017-02-24 NOTE — PROGRESS NOTES
Telemetry Report: pt has been SR/SB.  HR: 50s-80s  Measurements: (16/08/34)  Ectopy: f PAC    Measurements and updates per Gabby NAIR. This RN is responsible solely for telemetry strip interpretation and primary RN is responsible for acute changes in telemetry monitoring.

## 2017-02-24 NOTE — PROGRESS NOTES
"0700: Received report from LUZ Pemberton.  Pt is in bed resting, O2 @ room air , IVF NS running @ 10 ml/hr TKO.  Bed in lowest position, Bed alarm set, call light within reach.    0945: Medications given, see MAR, assessment completed, see Doc Flowsheets.  Pt in bed resting, bed alarm set, call light within reach.      1100: CNA assisted patient up to cardiac chair.  No reported pain, no signs of discomfort.      1300:  Patient resting in chair, requests a coke.  Called the kitchen to get the beverage she requested.      Tele Note:  betty Lemus, reports the patient's telemetry strip was interpreted to resemble sinus anish with a heart rate of 54.  Pt reportedly having rare PVC ectopy.      TX: .20  QRS: .08  QT: .44    Will continue to monitor telemetry and communicate with Mariah epperson.    1535:  Patient resting in cardiac chair, keeping patient clean and dry.    1730:  Patient in shower with YOVANNY Gupta.    1825: Patient resting in bed.  Reports that the skin on her arm is tender, doesn't like her arms being touched.  When removing the shower guard that protected her IV, she was upset with RN stating I was like a \"bull in a china shop,\" because her skin was so tender.  I was very careful, no skin tears resulted when this guard was removed.      1900: Gave report to LUZ Pemberton.  Patient is in bed resting.  Bed is in lowest position, bed alarm set, call light within reach.  Pt has no concerns or complaints at this time.    "

## 2017-02-25 VITALS
WEIGHT: 132.94 LBS | RESPIRATION RATE: 18 BRPM | TEMPERATURE: 97.8 F | HEIGHT: 64 IN | BODY MASS INDEX: 22.7 KG/M2 | SYSTOLIC BLOOD PRESSURE: 140 MMHG | HEART RATE: 55 BPM | OXYGEN SATURATION: 96 % | DIASTOLIC BLOOD PRESSURE: 76 MMHG

## 2017-02-25 PROCEDURE — 700101 HCHG RX REV CODE 250: Performed by: FAMILY MEDICINE

## 2017-02-25 PROCEDURE — 700105 HCHG RX REV CODE 258: Performed by: HOSPITALIST

## 2017-02-25 PROCEDURE — A9270 NON-COVERED ITEM OR SERVICE: HCPCS | Performed by: FAMILY MEDICINE

## 2017-02-25 PROCEDURE — 700102 HCHG RX REV CODE 250 W/ 637 OVERRIDE(OP): Performed by: FAMILY MEDICINE

## 2017-02-25 PROCEDURE — G8979 MOBILITY GOAL STATUS: HCPCS | Mod: CJ

## 2017-02-25 PROCEDURE — G8980 MOBILITY D/C STATUS: HCPCS | Mod: CM

## 2017-02-25 PROCEDURE — 700111 HCHG RX REV CODE 636 W/ 250 OVERRIDE (IP): Performed by: HOSPITALIST

## 2017-02-25 PROCEDURE — 99239 HOSP IP/OBS DSCHRG MGMT >30: CPT | Performed by: FAMILY MEDICINE

## 2017-02-25 RX ORDER — ACETAMINOPHEN 500 MG
1000 TABLET ORAL 2 TIMES DAILY
Qty: 120 TAB | Refills: 1 | Status: SHIPPED | OUTPATIENT
Start: 2017-02-25

## 2017-02-25 RX ORDER — PSEUDOEPHEDRINE HCL 30 MG
100 TABLET ORAL 2 TIMES DAILY
Qty: 60 CAP | Refills: 1 | Status: SHIPPED | OUTPATIENT
Start: 2017-02-25

## 2017-02-25 RX ORDER — VENLAFAXINE HYDROCHLORIDE 150 MG/1
150 CAPSULE, EXTENDED RELEASE ORAL DAILY
Qty: 30 CAP | Refills: 1 | Status: SHIPPED | OUTPATIENT
Start: 2017-02-25

## 2017-02-25 RX ORDER — LEVOTHYROXINE SODIUM 0.1 MG/1
100 TABLET ORAL
Qty: 30 TAB | Refills: 1 | Status: SHIPPED | OUTPATIENT
Start: 2017-02-25

## 2017-02-25 RX ADMIN — CEFTRIAXONE 2 G: 2 INJECTION, POWDER, FOR SOLUTION INTRAMUSCULAR; INTRAVENOUS at 08:48

## 2017-02-25 RX ADMIN — LEVOTHYROXINE SODIUM 75 MCG: 75 TABLET ORAL at 06:11

## 2017-02-25 RX ADMIN — METRONIDAZOLE 500 MG: 500 INJECTION, SOLUTION INTRAVENOUS at 06:11

## 2017-02-25 ASSESSMENT — PAIN SCALES - GENERAL
PAINLEVEL_OUTOF10: 0
PAINLEVEL_OUTOF10: 0

## 2017-02-25 NOTE — DISCHARGE PLANNING
JOHN Canada spoke with Jewels with [facility].  Jewels stating that equipment will be delivered today, [facility] will accept pt back and requested JOHN set up [facility] transport  for 1400.  JOHN completed transport and PCS form and faxed to DEVONTE Ordonez for transportation arrangements.

## 2017-02-25 NOTE — PROGRESS NOTES
0700: Received report from LUZ Pemberton.  Pt is in bed sleeping, O2 @ room air, IVF saline locked.  Bed in lowest position, Bed alarm set, call light within reach.    0745: Woke patient to see if we could assist her into the chair for breakfast, patient refuses.  We turned her in bed to prevent pressure ulcers.    0845: Hung IV antibiotic.  Patient difficult to arouse and falls asleep easily.  Holding oral medications as patient cannot safely take.  Assessment completed, please see DOC flowsheets.    Tele Note:  betty Youssef, reports the patient's telemetry strip was interpreted to resemble sinus rhythm with a heart rate of 70.  Pt reportedly having frequent PAC and rare PVC ectopy.      [place]: .16  QRS: .08  QT: .44    Will continue to monitor telemetry and communicate with Domonique epperson.

## 2017-02-25 NOTE — DISCHARGE INSTRUCTIONS
Discharge Instructions    Discharged to other by ambulance with escort. Discharged via ambulance, hospital escort: Yes.  Special equipment needed: Not Applicable    Be sure to schedule a follow-up appointment with your primary care doctor or any specialists as instructed.     Discharge Plan:   Diet Plan: Discussed  Activity Level: Discussed  Confirmed Follow up Appointment: Patient to Call and Schedule Appointment  Confirmed Symptoms Management: Discussed  Medication Reconciliation Updated: Yes  Influenza Vaccine Indication: Not indicated: Previously immunized this influenza season and > 8 years of age    I understand that a diet low in cholesterol, fat, and sodium is recommended for good health. Unless I have been given specific instructions below for another diet, I accept this instruction as my diet prescription.   Other diet: Regular dysphasia III with thin liquids    Special Instructions:   Please resume activity as tolerated.  Please resume a dysphasia III diet.  Please follow up with your primary care physician within two weeks of hospitalization.    · Is patient discharged on Warfarin / Coumadin?   No     · Is patient Post Blood Transfusion?  No    Sepsis, Adult  Sepsis is a serious infection of your blood or tissues that affects your whole body. The infection that causes sepsis may be bacterial, viral, fungal, or parasitic. Sepsis may be life threatening. Sepsis can cause your blood pressure to drop. This may result in shock. Shock causes your central nervous system and your organs to stop working correctly.   RISK FACTORS  Sepsis can happen in anyone, but it is more likely to happen in people who have weakened immune systems.  SIGNS AND SYMPTOMS   Symptoms of sepsis can include:  · Fever or low body temperature (hypothermia).  · Rapid breathing (hyperventilation).  · Chills.  · Rapid heartbeat (tachycardia).  · Confusion or light-headedness.  · Trouble breathing.  · Urinating much less than usual.  · Cool,  clammy skin or red, flushed skin.  · Other problems with the heart, kidneys, or brain.  DIAGNOSIS   Your health care provider will likely do tests to look for an infection, to see if the infection has spread to your blood, and to see how serious your condition is. Tests can include:  · Blood tests, including cultures of your blood.  · Cultures of other fluids from your body, such as:  ¨ Urine.  ¨ Pus from wounds.  ¨ Mucus coughed up from your lungs.  · Urine tests other than cultures.  · X-ray exams or other imaging tests.  TREATMENT   Treatment will begin with elimination of the source of infection. If your sepsis is likely caused by a bacterial or fungal infection, you will be given antibiotic or antifungal medicines.  You may also receive:  · Oxygen.  · Fluids through an IV tube.  · Medicines to increase your blood pressure.  · A machine to clean your blood (dialysis) if your kidneys fail.  · A machine to help you breathe if your lungs fail.  SEEK IMMEDIATE MEDICAL CARE IF:  You get an infection or develop any of the signs and symptoms of sepsis after surgery or a hospitalization.     This information is not intended to replace advice given to you by your health care provider. Make sure you discuss any questions you have with your health care provider.     Document Released: 09/15/2004 Document Revised: 05/03/2016 Document Reviewed: 08/25/2014  [employer] Interactive Patient Education ©2016 [employer]    Depression / Suicide Risk    As you are discharged from this [facility] facility, it is important to learn how to keep safe from harming yourself.    Recognize the warning signs:  · Abrupt changes in personality, positive or negative- including increase in energy   · Giving away possessions  · Change in eating patterns- significant weight changes-  positive or negative  · Change in sleeping patterns- unable to sleep or sleeping all the time   · Unwillingness or inability to  communicate  · Depression  · Unusual sadness, discouragement and loneliness  · Talk of wanting to die  · Neglect of personal appearance   · Rebelliousness- reckless behavior  · Withdrawal from people/activities they love  · Confusion- inability to concentrate     If you or a loved one observes any of these behaviors or has concerns about self-harm, here's what you can do:  · Talk about it- your feelings and reasons for harming yourself  · Remove any means that you might use to hurt yourself (examples: pills, rope, extension cords, firearm)  · Get professional help from the community (Mental Health, Substance Abuse, psychological counseling)  · Do not be alone:Call your Safe Contact- someone whom you trust who will be there for you.  · Call your local CRISIS HOTLINE 338-8000 or 780-613-2192  · Call your local Children's Mobile Crisis Response Team [place] (922) 465-7724 or www.YouRenew  · Call the toll free National Suicide Prevention Hotlines   · National Suicide Prevention Lifeline 260-092-OXCA (3863)  · National Hope Line Network 800-SUICIDE (544-5619)

## 2017-02-25 NOTE — PROGRESS NOTES
Tele strip at 1858 shows sinus rhythm, prolonged QT w/ HR of 93.  Measurements: .20/.08/.34    Tele Shift Summary:  Rhythm: Sinus rhythm  Rate: 70's-90's  Ectopy: Per CCT Criss, pt had frequent PAC's, rare PVC's/couplets.    Telemetry monitoring strips placed in pt chart.

## 2017-02-25 NOTE — PROGRESS NOTES
Assessment completed, alert and oriented  to person ,place and time.On room air sat 100%. Denied pain at this time.Due medication given per MAR. .

## 2017-02-25 NOTE — THERAPY
Discussed with nursing. Pt is going home with Hospice care. No further acute PT needs. Will DC PT.

## 2017-02-25 NOTE — CARE PLAN
Problem: Safety  Goal: Will remain free from injury  Fall precautions in place: treaded slipper socks on patient, appropriate signs placed, bed alarm set, call light within reach    Problem: Skin Integrity  Goal: Risk for impaired skin integrity will decrease  Keeping patient clean and dry to prevent skin breakdown

## 2017-02-25 NOTE — DISCHARGE PLANNING
Transport arrange with [facility]. Patient will be leaving today @1400 via [facility] going home. LM for JOHN Canada.

## 2017-02-26 NOTE — DISCHARGE SUMMARY
DISCHARGE DIAGNOSES:  1.  Encephalopathy.  2.  Subdural hematoma.  3.  Systemic inflammatory response syndrome.  4.  Hypotension.   5.  Paroxysmal atrial fibrillation.   6.  History of stroke.   7.  Hypothyroidism.   8.  Dyslipidemia.     CONSULTS:  Cardiology, Dr. Satnam Hogan.     PROCEDURES:  1.  CT scan of the head which showed probable subacute subdural hemorrhage,   new since the prior CT and measures up to 2.3 cm in thickness peripheral to   the right frontal and temporal lobe as well as a small portion of the parietal   lobe.  There is mass effect on the right cerebral hemisphere with midline   shift to left side.  2.  Repeat CT scan of the head shows stable moderate size of acute appearing   right subdural hematoma with unchanged residual right to left midline shift   measuring 5.3 mm.   3.  Repeat CT scan of the head which showed no change in right-sided subacute   appearing subdural hemorrhage and residual midline shift since the prior CT.    There is no new intracranial hemorrhage identified.  4.  Echocardiogram which showed left ventricular ejection fraction estimated   to be 70%.    HISTORY OF PRESENT ILLNESS:  For detailed H and P, please see Dr. Hogan's   note on 02/17/2017.  In brief summary, a 89-year-old white female who was   brought in secondary to increasing confusion and generalized weakness.    Patient apparently fell out of her bed with details unclear and apparently was   found on ground the last few days.  Patient was admitted for further   evaluation and management.    HOSPITAL COURSE:  Upon admission, she was noted to have a subacute subdural   hemorrhage.  She was also noted to have systemic inflammatory response   syndrome with possible pneumonia.  She was placed on intravenous Rocephin and   Flagyl which she just finished the course.  She was also noted to be   hypotensive, which resolved with intravenous fluids.  In regards to   encephalopathy, this improved; however, she had poor  oral intake.  She also   had paroxysmal atrial fibrillation.  She was placed on metoprolol.  She did go   back to sinus rhythm, but patient is a poor candidate now for anticoagulation   due to her subdural hematoma or hemorrhage.  She does have history of stroke   and had to hold her aspirin and Plavix secondary to subacute presentation of   the subdural hematoma.  At this point, family had decided that she be placed   on hospice with overall deterioration of her condition.  She will now be   discharged in stable condition.     DISCHARGE MEDICATIONS:  1.  Docusate 100 mg twice a day.  2.  Metoprolol 25 mg twice a day.  3.  Tylenol 1000 mg twice a day.  4.  Levothyroxine 50 mcg per day.  5.  Effexor  mg per day.     DIET:  Regular.    ACTIVITY:  As tolerated.    Total discharge time was 35 minutes.       ____________________________________     MD PORSHA BRIZUELA / LORI    DD:  02/25/2017 15:11:25  DT:  02/25/2017 16:21:20    D#:  [id number]  Job#:  [id number]

## 2021-01-14 DIAGNOSIS — Z23 NEED FOR VACCINATION: ICD-10-CM

## 2024-05-30 NOTE — PROGRESS NOTES
1320 Patient's in bed, IVF patent & infusing. No c/o's at this time. Family visitng. Fall protocol in effect. No distress noted.   141